# Patient Record
Sex: FEMALE | Race: WHITE | Employment: FULL TIME | URBAN - METROPOLITAN AREA
[De-identification: names, ages, dates, MRNs, and addresses within clinical notes are randomized per-mention and may not be internally consistent; named-entity substitution may affect disease eponyms.]

---

## 2017-06-06 ENCOUNTER — TELEPHONE (OUTPATIENT)
Dept: FAMILY MEDICINE CLINIC | Facility: CLINIC | Age: 27
End: 2017-06-06

## 2017-06-06 NOTE — TELEPHONE ENCOUNTER
Actions Requested: sent to ED for immediate eval  Situation/Background   Problem: \"abdominal pain from UTI that is moving up my side to my back and wraps around the kidney\"   Onset: > 3days   Associated Symptoms: afebrile,+ dysuria + urinary frequency, d

## 2017-06-06 NOTE — TELEPHONE ENCOUNTER
Patient states she has a severe UTI and back pain since Friday of last week. Patient requesting an appt. with Dr. Jovan Guerrero.   Nothing available for this week other than same day sick

## 2017-06-07 NOTE — TELEPHONE ENCOUNTER
Left message for patient, agree with emergency room evaluation but if she has not yet gone okay to page me to discuss symptoms and possible empiric treatment

## 2017-06-08 NOTE — TELEPHONE ENCOUNTER
Dr Anahy Blackman,    Pt went to [facility],  with pylonephritis. She was given bactrim & morphine. Feels slightly better but discomfort persist. Pt has f/u appt with Dr Anahy Blackman 6/13/17 @ 2:15pm.  Sent to Dr BARRERA [facility] for update.

## 2017-06-08 NOTE — TELEPHONE ENCOUNTER
Please contact rash to obtain results of urine culture if available. Patient try ibuprofen  mg (3 200 mg tablets) every 6 hours as needed for pain. Let us know if this is not effective.

## 2017-06-08 NOTE — TELEPHONE ENCOUNTER
Patient stated that since yesterday symptoms have gotten worse. Having mid-low back pain. Pain level currently is 6/10. Patient started Bactrim yesterday morning and was prescribed for 2 weeks.  Patient was given morphine in the hospital but was not given a

## 2017-06-08 NOTE — TELEPHONE ENCOUNTER
Reviewed doctor's recommendations with pt, pt agreed with plan of care and had no further questions at this time. Urine test results will be faxed to [facility] today.

## 2017-06-13 ENCOUNTER — OFFICE VISIT (OUTPATIENT)
Dept: FAMILY MEDICINE CLINIC | Facility: CLINIC | Age: 27
End: 2017-06-13

## 2017-06-13 VITALS
TEMPERATURE: 98 F | BODY MASS INDEX: 24.58 KG/M2 | RESPIRATION RATE: 18 BRPM | SYSTOLIC BLOOD PRESSURE: 110 MMHG | HEART RATE: 86 BPM | WEIGHT: 137 LBS | HEIGHT: 62.72 IN | DIASTOLIC BLOOD PRESSURE: 77 MMHG

## 2017-06-13 DIAGNOSIS — F90.0 ATTENTION DEFICIT HYPERACTIVITY DISORDER (ADHD), PREDOMINANTLY INATTENTIVE TYPE: ICD-10-CM

## 2017-06-13 DIAGNOSIS — N12 PYELONEPHRITIS: Primary | ICD-10-CM

## 2017-06-13 DIAGNOSIS — Z72.0 TOBACCO ABUSE: ICD-10-CM

## 2017-06-13 DIAGNOSIS — Q62.8: Chronic | ICD-10-CM

## 2017-06-13 DIAGNOSIS — R93.429 ABNORMAL FINDING ON DIAGNOSTIC IMAGING OF KIDNEY: ICD-10-CM

## 2017-06-13 PROCEDURE — 99213 OFFICE O/P EST LOW 20 MIN: CPT | Performed by: FAMILY MEDICINE

## 2017-06-13 PROCEDURE — 99214 OFFICE O/P EST MOD 30 MIN: CPT | Performed by: FAMILY MEDICINE

## 2017-06-13 RX ORDER — DEXTROAMPHETAMINE SACCHARATE, AMPHETAMINE ASPARTATE, DEXTROAMPHETAMINE SULFATE AND AMPHETAMINE SULFATE 2.5; 2.5; 2.5; 2.5 MG/1; MG/1; MG/1; MG/1
TABLET ORAL
Qty: 120 TABLET | Refills: 0 | Status: SHIPPED | OUTPATIENT
Start: 2017-06-13 | End: 2017-07-06

## 2017-06-13 RX ORDER — SULFAMETHOXAZOLE AND TRIMETHOPRIM 800; 160 MG/1; MG/1
1 TABLET ORAL
COMMUNITY
Start: 2017-06-06 | End: 2017-06-20

## 2017-06-13 RX ORDER — LEVONORGESTREL AND ETHINYL ESTRADIOL 0.15-0.03
1 KIT ORAL
Qty: 91 TABLET | Refills: 2 | Status: SHIPPED | OUTPATIENT
Start: 2017-06-13 | End: 2017-10-09

## 2017-06-13 RX ORDER — DULOXETIN HYDROCHLORIDE 30 MG/1
CAPSULE, DELAYED RELEASE ORAL
Refills: 0 | COMMUNITY
Start: 2017-05-04 | End: 2017-06-13 | Stop reason: ALTCHOICE

## 2017-06-13 RX ORDER — DEXTROAMPHETAMINE SACCHARATE, AMPHETAMINE ASPARTATE, DEXTROAMPHETAMINE SULFATE AND AMPHETAMINE SULFATE 2.5; 2.5; 2.5; 2.5 MG/1; MG/1; MG/1; MG/1
TABLET ORAL
Qty: 120 TABLET | Refills: 0 | Status: SHIPPED | OUTPATIENT
Start: 2017-06-13 | End: 2017-09-19

## 2017-06-13 RX ORDER — PROPRANOLOL HYDROCHLORIDE 80 MG/1
CAPSULE, EXTENDED RELEASE ORAL
Refills: 9 | COMMUNITY
Start: 2017-06-06 | End: 2017-06-13 | Stop reason: ALTCHOICE

## 2017-06-13 RX ORDER — DEXTROAMPHETAMINE SACCHARATE, AMPHETAMINE ASPARTATE, DEXTROAMPHETAMINE SULFATE AND AMPHETAMINE SULFATE 2.5; 2.5; 2.5; 2.5 MG/1; MG/1; MG/1; MG/1
TABLET ORAL
Qty: 120 TABLET | Refills: 0 | Status: SHIPPED | OUTPATIENT
Start: 2017-06-13 | End: 2017-06-16

## 2017-06-13 RX ORDER — LISDEXAMFETAMINE DIMESYLATE 50 MG
CAPSULE ORAL
Refills: 0 | COMMUNITY
Start: 2017-05-04 | End: 2017-06-13 | Stop reason: ALTCHOICE

## 2017-06-13 NOTE — PATIENT INSTRUCTIONS
How to Quit Smoking  Smoking is one of the hardest habits to break. About half of all people who have ever smoked have been able to quit. Most people who still smoke want to quit. Here are some of the best ways to stop smoking.     Keep trying  It takes m After reviewing your smoking patterns and prior attempts to quit, your doctor may offer a prescription medicine such as bupropion, varenicline, a nicotine inhaler, or nasal spray. Each has advantages and side effects. Your doctor can review these with you.

## 2017-06-14 DIAGNOSIS — N10 ACUTE PYELONEPHRITIS: Primary | ICD-10-CM

## 2017-06-16 RX ORDER — DEXTROAMPHETAMINE SACCHARATE, AMPHETAMINE ASPARTATE, DEXTROAMPHETAMINE SULFATE AND AMPHETAMINE SULFATE 2.5; 2.5; 2.5; 2.5 MG/1; MG/1; MG/1; MG/1
TABLET ORAL
Qty: 120 TABLET | Refills: 0 | Status: SHIPPED | OUTPATIENT
Start: 2017-06-16 | End: 2017-07-06

## 2017-06-23 ENCOUNTER — TELEPHONE (OUTPATIENT)
Dept: FAMILY MEDICINE CLINIC | Facility: CLINIC | Age: 27
End: 2017-06-23

## 2017-07-03 ENCOUNTER — HOSPITAL ENCOUNTER (OUTPATIENT)
Dept: ULTRASOUND IMAGING | Age: 27
Discharge: HOME | End: 2017-07-03
Attending: FAMILY MEDICINE
Payer: COMMERCIAL

## 2017-07-03 DIAGNOSIS — N12 PYELONEPHRITIS: ICD-10-CM

## 2017-07-03 DIAGNOSIS — Q62.8: Chronic | ICD-10-CM

## 2017-07-03 DIAGNOSIS — R93.429 ABNORMAL FINDING ON DIAGNOSTIC IMAGING OF KIDNEY: ICD-10-CM

## 2017-07-03 PROCEDURE — 76770 US EXAM ABDO BACK WALL COMP: CPT | Performed by: FAMILY MEDICINE

## 2017-07-06 ENCOUNTER — APPOINTMENT (OUTPATIENT)
Dept: LAB | Facility: HOSPITAL | Age: 27
End: 2017-07-06
Attending: FAMILY MEDICINE
Payer: COMMERCIAL

## 2017-07-06 ENCOUNTER — OFFICE VISIT (OUTPATIENT)
Dept: NEPHROLOGY | Facility: CLINIC | Age: 27
End: 2017-07-06

## 2017-07-06 VITALS
BODY MASS INDEX: 25.36 KG/M2 | TEMPERATURE: 99 F | WEIGHT: 137.81 LBS | DIASTOLIC BLOOD PRESSURE: 84 MMHG | HEART RATE: 79 BPM | HEIGHT: 62 IN | SYSTOLIC BLOOD PRESSURE: 118 MMHG

## 2017-07-06 DIAGNOSIS — N10 ACUTE PYELONEPHRITIS: ICD-10-CM

## 2017-07-06 DIAGNOSIS — N12 PYELONEPHRITIS: Primary | ICD-10-CM

## 2017-07-06 LAB
BILIRUB UR QL: NEGATIVE
COLOR UR: YELLOW
GLUCOSE UR-MCNC: NEGATIVE MG/DL
KETONES UR-MCNC: NEGATIVE MG/DL
NITRITE UR QL STRIP.AUTO: NEGATIVE
PH UR: 6 [PH] (ref 5–8)
PROT UR-MCNC: NEGATIVE MG/DL
RBC #/AREA URNS AUTO: 1 /HPF
SP GR UR STRIP: 1.01 (ref 1–1.03)
UROBILINOGEN UR STRIP-ACNC: <2
VIT C UR-MCNC: NEGATIVE MG/DL
WBC #/AREA URNS AUTO: 2 /HPF

## 2017-07-06 PROCEDURE — 99244 OFF/OP CNSLTJ NEW/EST MOD 40: CPT | Performed by: INTERNAL MEDICINE

## 2017-07-06 PROCEDURE — 99212 OFFICE O/P EST SF 10 MIN: CPT | Performed by: INTERNAL MEDICINE

## 2017-07-06 PROCEDURE — 87086 URINE CULTURE/COLONY COUNT: CPT

## 2017-07-06 PROCEDURE — 81001 URINALYSIS AUTO W/SCOPE: CPT

## 2017-07-06 RX ORDER — PROPRANOLOL HYDROCHLORIDE 80 MG/1
80 CAPSULE, EXTENDED RELEASE ORAL DAILY
Refills: 9 | COMMUNITY
Start: 2017-06-30 | End: 2017-10-09 | Stop reason: ALTCHOICE

## 2017-07-06 NOTE — PROGRESS NOTES
Consult Requested By: Dr. Oumou Maradiaga     Reason for Consult:  Follow up on pyelonephritis     HPI:     Cele Moyer is a 23 yrs old female with pmh of ADHD, congenital anomaly of ureter s/p surgery, recent pyelonephritis who presented today for follow drainage  Eyes:  Negative for eye discharge and vision loss  Cardiovascular:  Negative for chest pain, sob, irregular heartbeat/palpitations  Respiratory:  Negative for cough, dyspnea and wheezing  Gastrointestinal:  Negative for abdominal pain, constipati 7/2016     Follow up as needed        Orders This Visit:  No orders of the defined types were placed in this encounter.       Meds This Visit:    No prescriptions requested or ordered in this encounter       Imaging & Referrals:  None     7/6/2017  Georges Robles

## 2017-09-18 RX ORDER — DEXTROAMPHETAMINE SACCHARATE, AMPHETAMINE ASPARTATE, DEXTROAMPHETAMINE SULFATE AND AMPHETAMINE SULFATE 2.5; 2.5; 2.5; 2.5 MG/1; MG/1; MG/1; MG/1
TABLET ORAL
Qty: 120 TABLET | Refills: 0 | Status: CANCELLED | OUTPATIENT
Start: 2017-09-18

## 2017-09-18 RX ORDER — DEXTROAMPHETAMINE SACCHARATE, AMPHETAMINE ASPARTATE, DEXTROAMPHETAMINE SULFATE AND AMPHETAMINE SULFATE 2.5; 2.5; 2.5; 2.5 MG/1; MG/1; MG/1; MG/1
10 TABLET ORAL 3 TIMES DAILY
Qty: 90 TABLET | Refills: 0 | Status: SHIPPED | OUTPATIENT
Start: 2017-10-18 | End: 2017-11-17

## 2017-09-18 RX ORDER — DEXTROAMPHETAMINE SACCHARATE, AMPHETAMINE ASPARTATE, DEXTROAMPHETAMINE SULFATE AND AMPHETAMINE SULFATE 2.5; 2.5; 2.5; 2.5 MG/1; MG/1; MG/1; MG/1
10 TABLET ORAL 3 TIMES DAILY
Qty: 90 TABLET | Refills: 0 | Status: SHIPPED | OUTPATIENT
Start: 2017-09-18 | End: 2017-10-18

## 2017-09-18 NOTE — TELEPHONE ENCOUNTER
Pt calling regarding refill for amphetamine-dextroamphetamine (ADDERALL) 10 MG Oral Tab      Current Outpatient Prescriptions:  amphetamine-dextroamphetamine (ADDERALL) 10 MG Oral Tab 2 tablets by mouth at 8 AM, 1 tablet at 12 PM and 1 tablet at 4 PM Disp:

## 2017-09-18 NOTE — TELEPHONE ENCOUNTER
Requesting Adderall refill    Last filled 6/13/17  Last OV 6/13/17    Med pended for review, please advise

## 2017-09-19 RX ORDER — DEXTROAMPHETAMINE SACCHARATE, AMPHETAMINE ASPARTATE, DEXTROAMPHETAMINE SULFATE AND AMPHETAMINE SULFATE 2.5; 2.5; 2.5; 2.5 MG/1; MG/1; MG/1; MG/1
TABLET ORAL
Qty: 120 TABLET | Refills: 0 | Status: SHIPPED | OUTPATIENT
Start: 2017-11-18 | End: 2018-07-26

## 2017-09-21 NOTE — TELEPHONE ENCOUNTER
PA approved for up to 4 tabs per day effective 6/23/2017-6/23/2018. Patient notified via [address]
PA for Amphetamine Dextroamphetamine 10 mg tab completed with [employer] via CMM response time 3-5 business days KEY DANIEL.
Pt called in requesting to speak to an RN to get updates on this PA process, please.
Pt called to follow up.
Pt states Ad advised ins requesting override from Dr Luanne Harris for [facility] pharmacy was to have contacted Dr Luanne Harris 5 days ago- nothing in Epic  Pt thought issue maybe with dose/frequency the ins will cover  May need prior authorization    Isaias Carter
Patient/Caregiver provided printed discharge information.

## 2017-10-09 ENCOUNTER — OFFICE VISIT (OUTPATIENT)
Dept: FAMILY MEDICINE CLINIC | Facility: CLINIC | Age: 27
End: 2017-10-09

## 2017-10-09 VITALS
DIASTOLIC BLOOD PRESSURE: 78 MMHG | SYSTOLIC BLOOD PRESSURE: 122 MMHG | WEIGHT: 141.63 LBS | HEART RATE: 77 BPM | HEIGHT: 62.6 IN | RESPIRATION RATE: 17 BRPM | TEMPERATURE: 98 F | BODY MASS INDEX: 25.41 KG/M2

## 2017-10-09 DIAGNOSIS — Z72.0 TOBACCO ABUSE: ICD-10-CM

## 2017-10-09 DIAGNOSIS — Z01.419 ENCOUNTER FOR GYNECOLOGICAL EXAMINATION WITHOUT ABNORMAL FINDING: Primary | ICD-10-CM

## 2017-10-09 DIAGNOSIS — F90.0 ATTENTION DEFICIT HYPERACTIVITY DISORDER (ADHD), PREDOMINANTLY INATTENTIVE TYPE: ICD-10-CM

## 2017-10-09 PROCEDURE — 90686 IIV4 VACC NO PRSV 0.5 ML IM: CPT | Performed by: FAMILY MEDICINE

## 2017-10-09 PROCEDURE — 99395 PREV VISIT EST AGE 18-39: CPT | Performed by: FAMILY MEDICINE

## 2017-10-09 PROCEDURE — 90471 IMMUNIZATION ADMIN: CPT | Performed by: FAMILY MEDICINE

## 2017-10-09 RX ORDER — LEVONORGESTREL AND ETHINYL ESTRADIOL 0.15-0.03
1 KIT ORAL
Qty: 91 TABLET | Refills: 3 | Status: SHIPPED | OUTPATIENT
Start: 2017-10-09 | End: 2017-11-07

## 2017-10-09 NOTE — PROGRESS NOTES
HPI:    Patient ID: Rick Bahena is a 33year old female. HPI    Review of Systems   Constitutional: Negative. Respiratory: Negative. Cardiovascular: Negative. Gastrointestinal: Negative. Skin: Negative. Neurological: Negative. Skin is warm and dry. Psychiatric: She has a normal mood and affect.  Her behavior is normal. Judgment and thought content normal.              ASSESSMENT/PLAN:   Encounter for gynecological examination without abnormal finding  (primary encounter diagnos

## 2017-11-07 RX ORDER — LEVONORGESTREL AND ETHINYL ESTRADIOL 0.15-0.03
KIT ORAL
Qty: 91 TABLET | Refills: 0 | Status: SHIPPED | OUTPATIENT
Start: 2017-11-07 | End: 2018-04-27

## 2017-12-19 RX ORDER — DEXTROAMPHETAMINE SACCHARATE, AMPHETAMINE ASPARTATE, DEXTROAMPHETAMINE SULFATE AND AMPHETAMINE SULFATE 2.5; 2.5; 2.5; 2.5 MG/1; MG/1; MG/1; MG/1
TABLET ORAL
Qty: 120 TABLET | Refills: 0 | Status: SHIPPED | OUTPATIENT
Start: 2017-12-19 | End: 2018-07-26

## 2017-12-19 RX ORDER — DEXTROAMPHETAMINE SACCHARATE, AMPHETAMINE ASPARTATE, DEXTROAMPHETAMINE SULFATE AND AMPHETAMINE SULFATE 2.5; 2.5; 2.5; 2.5 MG/1; MG/1; MG/1; MG/1
TABLET ORAL
Qty: 120 TABLET | Refills: 0 | Status: CANCELLED | OUTPATIENT
Start: 2017-12-19

## 2017-12-19 RX ORDER — DEXTROAMPHETAMINE SACCHARATE, AMPHETAMINE ASPARTATE, DEXTROAMPHETAMINE SULFATE AND AMPHETAMINE SULFATE 2.5; 2.5; 2.5; 2.5 MG/1; MG/1; MG/1; MG/1
TABLET ORAL
Qty: 120 TABLET | Refills: 0 | Status: SHIPPED | OUTPATIENT
Start: 2018-02-19 | End: 2018-07-26

## 2017-12-19 RX ORDER — DEXTROAMPHETAMINE SACCHARATE, AMPHETAMINE ASPARTATE, DEXTROAMPHETAMINE SULFATE AND AMPHETAMINE SULFATE 2.5; 2.5; 2.5; 2.5 MG/1; MG/1; MG/1; MG/1
TABLET ORAL
Qty: 120 TABLET | Refills: 0 | Status: SHIPPED | OUTPATIENT
Start: 2018-01-19 | End: 2018-05-29

## 2017-12-19 NOTE — TELEPHONE ENCOUNTER
Patient is calling to request a refill for the medication listed below. Please advise. Patient is completely out.      amphetamine-dextroamphetamine (ADDERALL) 10 MG Oral Tab 2 tablets by mouth at 8 AM, 1 tablet at 12 PM and 1 tablet at 4 PM Disp: 120 table

## 2018-01-05 ENCOUNTER — OFFICE VISIT (OUTPATIENT)
Dept: FAMILY MEDICINE CLINIC | Facility: CLINIC | Age: 28
End: 2018-01-05

## 2018-01-05 VITALS
HEIGHT: 62.6 IN | HEART RATE: 101 BPM | SYSTOLIC BLOOD PRESSURE: 127 MMHG | TEMPERATURE: 98 F | BODY MASS INDEX: 25.41 KG/M2 | WEIGHT: 141.63 LBS | RESPIRATION RATE: 18 BRPM | DIASTOLIC BLOOD PRESSURE: 85 MMHG

## 2018-01-05 DIAGNOSIS — R39.9 URINARY TRACT INFECTION SYMPTOMS: Primary | ICD-10-CM

## 2018-01-05 PROCEDURE — 99213 OFFICE O/P EST LOW 20 MIN: CPT | Performed by: FAMILY MEDICINE

## 2018-01-05 PROCEDURE — 99212 OFFICE O/P EST SF 10 MIN: CPT | Performed by: FAMILY MEDICINE

## 2018-01-05 RX ORDER — SULFAMETHOXAZOLE AND TRIMETHOPRIM 800; 160 MG/1; MG/1
1 TABLET ORAL 2 TIMES DAILY
Qty: 20 TABLET | Refills: 0 | Status: SHIPPED | OUTPATIENT
Start: 2018-01-05 | End: 2018-01-15

## 2018-01-05 NOTE — PROGRESS NOTES
HPI:    Patient ID: Kaya Flores is a 33year old female. UTI   This is a recurrent problem. The current episode started in the past 7 days. The problem occurs intermittently. The problem has been gradually worsening.  Pertinent negatives include no abdo push fluids, Bactrim as directed pending culture results. Patient may not be able to access my chart. Call if not improved in 3 days.       Orders Placed This Encounter      Urine Culture, Routine [E]    Meds This Visit:  Signed Prescriptions Disp Refills

## 2018-04-30 ENCOUNTER — TELEPHONE (OUTPATIENT)
Dept: OTHER | Age: 28
End: 2018-04-30

## 2018-04-30 RX ORDER — LEVONORGESTREL AND ETHINYL ESTRADIOL 0.15-0.03
1 KIT ORAL
Qty: 91 TABLET | Refills: 2 | Status: SHIPPED | OUTPATIENT
Start: 2018-04-30 | End: 2019-03-18

## 2018-04-30 NOTE — TELEPHONE ENCOUNTER
[facility] called to ask for [facility] prescription for mail order pharmacy. Noted prescription was sent to [place] yesterday.  Pt was on the phone as well, states she picked up one 90-day prescription of med at [place] yest

## 2018-05-29 RX ORDER — DEXTROAMPHETAMINE SACCHARATE, AMPHETAMINE ASPARTATE, DEXTROAMPHETAMINE SULFATE AND AMPHETAMINE SULFATE 2.5; 2.5; 2.5; 2.5 MG/1; MG/1; MG/1; MG/1
TABLET ORAL
Qty: 120 TABLET | Refills: 0 | Status: SHIPPED | OUTPATIENT
Start: 2018-05-29 | End: 2018-07-26

## 2018-05-29 NOTE — TELEPHONE ENCOUNTER
Please advise on refill request.     Refill Protocol Appointment Criteria  · Appointment scheduled in the past 6 months or in the next 3 months  Recent Outpatient Visits            4 months ago Urinary tract infection symptoms    [address] [place]

## 2018-07-24 ENCOUNTER — NURSE TRIAGE (OUTPATIENT)
Dept: FAMILY MEDICINE CLINIC | Facility: CLINIC | Age: 28
End: 2018-07-24

## 2018-07-24 RX ORDER — DEXTROAMPHETAMINE SACCHARATE, AMPHETAMINE ASPARTATE, DEXTROAMPHETAMINE SULFATE AND AMPHETAMINE SULFATE 2.5; 2.5; 2.5; 2.5 MG/1; MG/1; MG/1; MG/1
TABLET ORAL
Qty: 120 TABLET | Refills: 0 | OUTPATIENT
Start: 2018-07-24

## 2018-07-24 RX ORDER — DEXTROAMPHETAMINE SACCHARATE, AMPHETAMINE ASPARTATE, DEXTROAMPHETAMINE SULFATE AND AMPHETAMINE SULFATE 2.5; 2.5; 2.5; 2.5 MG/1; MG/1; MG/1; MG/1
TABLET ORAL
Qty: 120 TABLET | Refills: 0 | OUTPATIENT
Start: 2018-09-22

## 2018-07-24 RX ORDER — DEXTROAMPHETAMINE SACCHARATE, AMPHETAMINE ASPARTATE, DEXTROAMPHETAMINE SULFATE AND AMPHETAMINE SULFATE 2.5; 2.5; 2.5; 2.5 MG/1; MG/1; MG/1; MG/1
TABLET ORAL
Qty: 120 TABLET | Refills: 0 | OUTPATIENT
Start: 2018-08-23

## 2018-07-24 RX ORDER — SULFAMETHOXAZOLE AND TRIMETHOPRIM 800; 160 MG/1; MG/1
1 TABLET ORAL 2 TIMES DAILY
Qty: 14 TABLET | Refills: 0 | Status: SHIPPED | OUTPATIENT
Start: 2018-07-24 | End: 2018-07-31

## 2018-07-24 NOTE — TELEPHONE ENCOUNTER
Dr SINGLETON=please see message below, Juan Carlos Rodriguez!!!    Spoke with patient (identified name and ) ,advised Dr Jose F Padron note  and verbalized understanding.  States that she will complete the culture on Thursday during OV, will not start the abx until the culture is don

## 2018-07-24 NOTE — TELEPHONE ENCOUNTER
Pending Prescriptions Disp Refills    amphetamine-dextroamphetamine (ADDERALL) 10 MG Oral Tab 120 tablet 0     Si tablets by mouth at 8 AM, 1 tablet at 12 PM and 1 tablet at 4 PM      amphetamine-dextroamphetamine (ADDERALL) 10 MG Oral Tab 120 tabl

## 2018-07-24 NOTE — TELEPHONE ENCOUNTER
Action Requested: Summary for Provider     []  Critical Lab, Recommendations Needed  [] Need Additional Advice  []   FYI    []   Need Orders  [x] Need Medications Sent to Pharmacy  []  Other     SUMMARY: Patient requesting antibiotic sent to her pharmacy f

## 2018-07-24 NOTE — TELEPHONE ENCOUNTER
Please let pt know that I strongly recommend come in for NV to provide urine culture befor starting Bactrim that I sent to pharmacy.  This way we can check for antibiotic sensitivities which is important for her

## 2018-07-24 NOTE — TELEPHONE ENCOUNTER
I do not understand this message. If she clear that I recommend starting antibiotic today, and she is refusing?

## 2018-07-24 NOTE — TELEPHONE ENCOUNTER
Advised patient of Dr. Evgeny Vences note. Patient verbalized understanding. Patient states she cannot come into office for nurse visit for urine culture. Per patient, she lives three hours away and cannot drive out to [facility] to get urine culture done.

## 2018-07-24 NOTE — TELEPHONE ENCOUNTER
She should start antibiotic today, otherwise at risk for pyelonephritis which she has had before. Try to complete culture but if unable to, just start antibiotic.

## 2018-07-24 NOTE — TELEPHONE ENCOUNTER
Patient scheduled follow-up appointment for Adderall. States she will take Azo over the counter until Thursday. States she will complete urine culture on Thursday while here for follow-up appointment. Dr. Joseph Miller:   Please disregard message below.

## 2018-07-25 NOTE — TELEPHONE ENCOUNTER
Spoke to patient. To clarify, patient states she has been told in the past not to start abx until culture is done. I reiterated your orders, to start today due to risk of pyelonephritis. Even if she cant submit specimen today, still start abx.   She state

## 2018-07-26 ENCOUNTER — OFFICE VISIT (OUTPATIENT)
Dept: FAMILY MEDICINE CLINIC | Facility: CLINIC | Age: 28
End: 2018-07-26
Payer: COMMERCIAL

## 2018-07-26 VITALS
DIASTOLIC BLOOD PRESSURE: 88 MMHG | BODY MASS INDEX: 25 KG/M2 | SYSTOLIC BLOOD PRESSURE: 122 MMHG | WEIGHT: 139 LBS | TEMPERATURE: 98 F | RESPIRATION RATE: 16 BRPM | HEART RATE: 96 BPM

## 2018-07-26 DIAGNOSIS — F32.A MILD DEPRESSIVE DISORDER: ICD-10-CM

## 2018-07-26 DIAGNOSIS — R39.9 URINARY TRACT INFECTION SYMPTOMS: Primary | ICD-10-CM

## 2018-07-26 DIAGNOSIS — Z72.0 TOBACCO ABUSE: ICD-10-CM

## 2018-07-26 DIAGNOSIS — F90.0 ATTENTION DEFICIT HYPERACTIVITY DISORDER (ADHD), PREDOMINANTLY INATTENTIVE TYPE: ICD-10-CM

## 2018-07-26 PROCEDURE — 99212 OFFICE O/P EST SF 10 MIN: CPT | Performed by: FAMILY MEDICINE

## 2018-07-26 PROCEDURE — 99214 OFFICE O/P EST MOD 30 MIN: CPT | Performed by: FAMILY MEDICINE

## 2018-07-26 RX ORDER — DEXTROAMPHETAMINE SACCHARATE, AMPHETAMINE ASPARTATE, DEXTROAMPHETAMINE SULFATE AND AMPHETAMINE SULFATE 2.5; 2.5; 2.5; 2.5 MG/1; MG/1; MG/1; MG/1
TABLET ORAL
Qty: 90 TABLET | Refills: 0 | Status: SHIPPED | OUTPATIENT
Start: 2018-09-26 | End: 2019-05-16

## 2018-07-26 RX ORDER — DEXTROAMPHETAMINE SACCHARATE, AMPHETAMINE ASPARTATE, DEXTROAMPHETAMINE SULFATE AND AMPHETAMINE SULFATE 2.5; 2.5; 2.5; 2.5 MG/1; MG/1; MG/1; MG/1
TABLET ORAL
Qty: 90 TABLET | Refills: 0 | Status: SHIPPED | OUTPATIENT
Start: 2018-07-26 | End: 2019-06-06

## 2018-07-26 RX ORDER — DEXTROAMPHETAMINE SACCHARATE, AMPHETAMINE ASPARTATE, DEXTROAMPHETAMINE SULFATE AND AMPHETAMINE SULFATE 2.5; 2.5; 2.5; 2.5 MG/1; MG/1; MG/1; MG/1
TABLET ORAL
Qty: 90 TABLET | Refills: 0 | Status: SHIPPED | OUTPATIENT
Start: 2018-08-26 | End: 2019-06-06

## 2018-07-26 NOTE — PROGRESS NOTES
HPI:    Patient ID: Carolina Dickens is a 33year old female. Urinary   This is a recurrent problem. The current episode started in the past 7 days. The problem has been gradually worsening. Associated symptoms include fatigue and urinary symptoms.  Violet Pounds guarding. CVA nontender, suprapubic tenderness present   Musculoskeletal: She exhibits no edema. Lymphadenopathy:     She has no cervical adenopathy. Neurological: She is alert and oriented to person, place, and time. Skin: Skin is warm and dry. and 1 tablet at 4 PM           Imaging & Referrals:  None       #[id number]

## 2018-10-23 ENCOUNTER — OFFICE VISIT (OUTPATIENT)
Dept: FAMILY MEDICINE CLINIC | Facility: CLINIC | Age: 28
End: 2018-10-23
Payer: COMMERCIAL

## 2018-10-23 VITALS
HEART RATE: 86 BPM | TEMPERATURE: 98 F | RESPIRATION RATE: 16 BRPM | BODY MASS INDEX: 26 KG/M2 | DIASTOLIC BLOOD PRESSURE: 82 MMHG | WEIGHT: 144 LBS | SYSTOLIC BLOOD PRESSURE: 118 MMHG

## 2018-10-23 DIAGNOSIS — Z71.84 TRAVEL ADVICE ENCOUNTER: Primary | ICD-10-CM

## 2018-10-23 DIAGNOSIS — F90.0 ATTENTION DEFICIT HYPERACTIVITY DISORDER (ADHD), PREDOMINANTLY INATTENTIVE TYPE: ICD-10-CM

## 2018-10-23 DIAGNOSIS — Z72.0 TOBACCO ABUSE: ICD-10-CM

## 2018-10-23 DIAGNOSIS — F40.243 FLYING PHOBIA: ICD-10-CM

## 2018-10-23 PROCEDURE — 90632 HEPA VACCINE ADULT IM: CPT | Performed by: FAMILY MEDICINE

## 2018-10-23 PROCEDURE — 90472 IMMUNIZATION ADMIN EACH ADD: CPT | Performed by: FAMILY MEDICINE

## 2018-10-23 PROCEDURE — 99212 OFFICE O/P EST SF 10 MIN: CPT | Performed by: FAMILY MEDICINE

## 2018-10-23 PROCEDURE — 90686 IIV4 VACC NO PRSV 0.5 ML IM: CPT | Performed by: FAMILY MEDICINE

## 2018-10-23 PROCEDURE — 99214 OFFICE O/P EST MOD 30 MIN: CPT | Performed by: FAMILY MEDICINE

## 2018-10-23 PROCEDURE — 90471 IMMUNIZATION ADMIN: CPT | Performed by: FAMILY MEDICINE

## 2018-10-23 RX ORDER — CLONAZEPAM 0.5 MG/1
TABLET ORAL
Qty: 15 TABLET | Refills: 0 | Status: SHIPPED | OUTPATIENT
Start: 2018-10-23 | End: 2019-06-06 | Stop reason: ALTCHOICE

## 2018-10-23 RX ORDER — AZITHROMYCIN 250 MG/1
1000 TABLET, FILM COATED ORAL ONCE
Qty: 4 TABLET | Refills: 0 | Status: SHIPPED | OUTPATIENT
Start: 2018-10-23 | End: 2018-10-23

## 2018-10-23 RX ORDER — DEXTROAMPHETAMINE SACCHARATE, AMPHETAMINE ASPARTATE, DEXTROAMPHETAMINE SULFATE AND AMPHETAMINE SULFATE 2.5; 2.5; 2.5; 2.5 MG/1; MG/1; MG/1; MG/1
10 TABLET ORAL 3 TIMES DAILY
Qty: 90 TABLET | Refills: 0 | Status: SHIPPED | OUTPATIENT
Start: 2018-10-23 | End: 2018-11-22

## 2018-10-23 RX ORDER — CIPROFLOXACIN 250 MG/1
250 TABLET, FILM COATED ORAL 2 TIMES DAILY
Qty: 10 TABLET | Refills: 0 | Status: SHIPPED | OUTPATIENT
Start: 2018-10-23 | End: 2018-10-28

## 2018-10-23 RX ORDER — DEXTROAMPHETAMINE SACCHARATE, AMPHETAMINE ASPARTATE, DEXTROAMPHETAMINE SULFATE AND AMPHETAMINE SULFATE 2.5; 2.5; 2.5; 2.5 MG/1; MG/1; MG/1; MG/1
10 TABLET ORAL 3 TIMES DAILY
Qty: 90 TABLET | Refills: 0 | Status: SHIPPED | OUTPATIENT
Start: 2018-12-23 | End: 2019-01-22

## 2018-10-23 RX ORDER — ATOVAQUONE AND PROGUANIL HYDROCHLORIDE 250; 100 MG/1; MG/1
1 TABLET, FILM COATED ORAL DAILY
Qty: 25 TABLET | Refills: 0 | Status: SHIPPED | OUTPATIENT
Start: 2018-10-23 | End: 2018-11-22

## 2018-10-23 RX ORDER — DEXTROAMPHETAMINE SACCHARATE, AMPHETAMINE ASPARTATE, DEXTROAMPHETAMINE SULFATE AND AMPHETAMINE SULFATE 2.5; 2.5; 2.5; 2.5 MG/1; MG/1; MG/1; MG/1
10 TABLET ORAL 3 TIMES DAILY
Qty: 90 TABLET | Refills: 0 | Status: SHIPPED | OUTPATIENT
Start: 2018-11-23 | End: 2018-12-23

## 2018-10-23 NOTE — PROGRESS NOTES
HPI:    Patient ID: Kaya Flores is a 33year old female. See below      Consult         Review of Systems   Constitutional: Negative. Respiratory: Negative. Cardiovascular: Negative. Gastrointestinal: Negative. Skin: Negative.     Neurologic Disp: 90 tablet Rfl: 0   Levonorgest-Eth Estrad 91-Day (QUASENSE) 0.15-0.03 MG Oral Tab Take 1 tablet by mouth once daily.  Disp: 91 tablet Rfl: 2     Allergies:  Kiwi Extract            RASH   PHYSICAL EXAM:   Physical Exam   Constitutional: She is oriente mouth every other day. • Atovaquone-Proguanil HCl (MALARONE) 250-100 MG Oral Tab 25 tablet 0     Sig: Take 1 tablet by mouth daily. Start 1 day before travel and continue until 7 days after return.    • azithromycin 250 MG Oral Tab 4 tablet 0     Sig: Swetha Galarza

## 2019-01-23 ENCOUNTER — TELEPHONE (OUTPATIENT)
Dept: FAMILY MEDICINE CLINIC | Facility: CLINIC | Age: 29
End: 2019-01-23

## 2019-01-23 NOTE — TELEPHONE ENCOUNTER
Pt is calling because she had a script for medication that she took to have filled at her pharmacy and was told that it had . Pt stts that the date on the script .  Please advise      Current Outpatient Medications:              amphetamine-dext

## 2019-01-24 RX ORDER — DEXTROAMPHETAMINE SACCHARATE, AMPHETAMINE ASPARTATE, DEXTROAMPHETAMINE SULFATE AND AMPHETAMINE SULFATE 2.5; 2.5; 2.5; 2.5 MG/1; MG/1; MG/1; MG/1
10 TABLET ORAL 3 TIMES DAILY
Qty: 90 TABLET | Refills: 0 | Status: SHIPPED | OUTPATIENT
Start: 2019-02-24 | End: 2019-03-26

## 2019-01-24 RX ORDER — DEXTROAMPHETAMINE SACCHARATE, AMPHETAMINE ASPARTATE, DEXTROAMPHETAMINE SULFATE AND AMPHETAMINE SULFATE 2.5; 2.5; 2.5; 2.5 MG/1; MG/1; MG/1; MG/1
10 TABLET ORAL 3 TIMES DAILY
Qty: 90 TABLET | Refills: 0 | Status: SHIPPED | OUTPATIENT
Start: 2019-03-24 | End: 2019-04-23

## 2019-01-24 RX ORDER — DEXTROAMPHETAMINE SACCHARATE, AMPHETAMINE ASPARTATE, DEXTROAMPHETAMINE SULFATE AND AMPHETAMINE SULFATE 2.5; 2.5; 2.5; 2.5 MG/1; MG/1; MG/1; MG/1
10 TABLET ORAL 3 TIMES DAILY
Qty: 90 TABLET | Refills: 0 | Status: SHIPPED | OUTPATIENT
Start: 2019-01-24 | End: 2019-02-23

## 2019-01-24 NOTE — TELEPHONE ENCOUNTER
Controlled medication pending for review. If approved needs to be called in or faxed by on-site staff. Last Rx: 10/23/18  LOV: 10/23/18    Patient is out of medication;  Please contact when rx is ready.

## 2019-03-19 RX ORDER — LEVONORGESTREL AND ETHINYL ESTRADIOL 0.15-0.03
KIT ORAL
Qty: 91 TABLET | Refills: 1 | Status: SHIPPED | OUTPATIENT
Start: 2019-03-19 | End: 2019-09-16

## 2019-03-19 NOTE — TELEPHONE ENCOUNTER
Please call patient and schedule appt for routine annual physical, one refill sent but MUST make appt at this time

## 2019-03-19 NOTE — TELEPHONE ENCOUNTER
Gynecology Medications  Protocol Criteria:  · Appointment scheduled in the past 12 months or the next 3 months  · Pap smear in the past 12 months  · Pap smear WNL manually verified  Recent Outpatient Visits            4 months ago Travel advice encounter

## 2019-03-23 NOTE — TELEPHONE ENCOUNTER
Left detailed message on voice mail stating name to call back and schedule a routine physical appt with Dr. Ladarius Hanson

## 2019-05-16 DIAGNOSIS — M24.9 HYPERMOBILE JOINTS: Primary | ICD-10-CM

## 2019-05-16 DIAGNOSIS — M21.619 BUNION OF GREAT TOE: ICD-10-CM

## 2019-05-16 NOTE — TELEPHONE ENCOUNTER
Patient following up, has annual phys scheduled on 6/6/19 with Dr BARRERA [facility], reports has a hx of chronic foot pain due to bunions and a bone spur, recently started new job with a lot of standing and is having increased foot pain, would like a referral to follow

## 2019-05-17 ENCOUNTER — TELEPHONE (OUTPATIENT)
Dept: FAMILY MEDICINE CLINIC | Facility: CLINIC | Age: 29
End: 2019-05-17

## 2019-05-17 NOTE — TELEPHONE ENCOUNTER
Patients mom came to [place] [address] office to  prescription,the triage nurse put in message after Dr Gregoria Ellison left on 5-16-19,--she will wait till Dr Gregoria Ellison comes back to office on 5-21-19.

## 2019-05-18 RX ORDER — DEXTROAMPHETAMINE SACCHARATE, AMPHETAMINE ASPARTATE, DEXTROAMPHETAMINE SULFATE AND AMPHETAMINE SULFATE 2.5; 2.5; 2.5; 2.5 MG/1; MG/1; MG/1; MG/1
TABLET ORAL
Qty: 90 TABLET | Refills: 0 | Status: SHIPPED | OUTPATIENT
Start: 2019-05-18 | End: 2019-06-06

## 2019-06-06 ENCOUNTER — OFFICE VISIT (OUTPATIENT)
Dept: FAMILY MEDICINE CLINIC | Facility: CLINIC | Age: 29
End: 2019-06-06
Payer: COMMERCIAL

## 2019-06-06 VITALS
TEMPERATURE: 98 F | WEIGHT: 148.38 LBS | DIASTOLIC BLOOD PRESSURE: 80 MMHG | HEART RATE: 92 BPM | SYSTOLIC BLOOD PRESSURE: 130 MMHG | HEIGHT: 63.13 IN | RESPIRATION RATE: 18 BRPM | BODY MASS INDEX: 26.29 KG/M2

## 2019-06-06 DIAGNOSIS — M79.672 FOOT PAIN, BILATERAL: ICD-10-CM

## 2019-06-06 DIAGNOSIS — F17.200 TOBACCO USE DISORDER: ICD-10-CM

## 2019-06-06 DIAGNOSIS — M21.612 BUNION, LEFT: ICD-10-CM

## 2019-06-06 DIAGNOSIS — Z00.00 ROUTINE PHYSICAL EXAMINATION: Primary | ICD-10-CM

## 2019-06-06 DIAGNOSIS — F98.8 ATTENTION DEFICIT DISORDER (ADD) WITHOUT HYPERACTIVITY: Chronic | ICD-10-CM

## 2019-06-06 DIAGNOSIS — M79.671 FOOT PAIN, BILATERAL: ICD-10-CM

## 2019-06-06 PROCEDURE — 99395 PREV VISIT EST AGE 18-39: CPT | Performed by: FAMILY MEDICINE

## 2019-06-06 RX ORDER — DIAZEPAM 5 MG/1
TABLET ORAL
Qty: 6 TABLET | Refills: 0 | Status: SHIPPED | OUTPATIENT
Start: 2019-06-06 | End: 2020-01-30

## 2019-06-06 RX ORDER — DEXTROAMPHETAMINE SACCHARATE, AMPHETAMINE ASPARTATE, DEXTROAMPHETAMINE SULFATE AND AMPHETAMINE SULFATE 2.5; 2.5; 2.5; 2.5 MG/1; MG/1; MG/1; MG/1
10 TABLET ORAL 3 TIMES DAILY
Qty: 90 TABLET | Refills: 0 | Status: SHIPPED | OUTPATIENT
Start: 2019-08-20 | End: 2019-09-19

## 2019-06-06 RX ORDER — DEXTROAMPHETAMINE SACCHARATE, AMPHETAMINE ASPARTATE, DEXTROAMPHETAMINE SULFATE AND AMPHETAMINE SULFATE 2.5; 2.5; 2.5; 2.5 MG/1; MG/1; MG/1; MG/1
10 TABLET ORAL 3 TIMES DAILY
Qty: 90 TABLET | Refills: 0 | Status: SHIPPED | OUTPATIENT
Start: 2019-07-20 | End: 2019-08-19

## 2019-06-06 RX ORDER — DEXTROAMPHETAMINE SACCHARATE, AMPHETAMINE ASPARTATE, DEXTROAMPHETAMINE SULFATE AND AMPHETAMINE SULFATE 2.5; 2.5; 2.5; 2.5 MG/1; MG/1; MG/1; MG/1
10 TABLET ORAL 3 TIMES DAILY
Qty: 90 TABLET | Refills: 0 | Status: SHIPPED | OUTPATIENT
Start: 2019-06-20 | End: 2019-07-20

## 2019-06-06 NOTE — PROGRESS NOTES
HPI:    Patient ID: Chano Oates is a 33year old female. HPI    Review of Systems   Constitutional: Negative. Respiratory: Negative. Cardiovascular: Negative. Gastrointestinal: Negative. Skin: Negative. Neurological: Negative. finishing her masters for . Encourage regular exercise. Immunizations up-to-date. Return for fasting labs. 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   Will try Valium as directed reviewed instructions and side effects of medica

## 2019-09-18 RX ORDER — LEVONORGESTREL AND ETHINYL ESTRADIOL 0.15-0.03
KIT ORAL
Qty: 91 TABLET | Refills: 1 | Status: SHIPPED | OUTPATIENT
Start: 2019-09-18 | End: 2020-03-16

## 2019-09-18 NOTE — TELEPHONE ENCOUNTER
Gynecology Medications  Protocol Criteria:  · Appointment scheduled in the past 12 months or the next 3 months  · Pap smear in the past 12 months  · Pap smear WNL manually verified  Recent Outpatient Visits            3 months ago Routine physical examinat

## 2019-09-20 RX ORDER — DEXTROAMPHETAMINE SACCHARATE, AMPHETAMINE ASPARTATE, DEXTROAMPHETAMINE SULFATE AND AMPHETAMINE SULFATE 2.5; 2.5; 2.5; 2.5 MG/1; MG/1; MG/1; MG/1
10 TABLET ORAL 3 TIMES DAILY
Qty: 90 TABLET | Refills: 0 | Status: SHIPPED | OUTPATIENT
Start: 2019-09-20 | End: 2020-02-25

## 2019-09-20 RX ORDER — DEXTROAMPHETAMINE SACCHARATE, AMPHETAMINE ASPARTATE, DEXTROAMPHETAMINE SULFATE AND AMPHETAMINE SULFATE 2.5; 2.5; 2.5; 2.5 MG/1; MG/1; MG/1; MG/1
10 TABLET ORAL 3 TIMES DAILY
Qty: 90 TABLET | Refills: 0 | Status: SHIPPED | OUTPATIENT
Start: 2019-10-20 | End: 2019-11-19

## 2019-09-20 RX ORDER — DEXTROAMPHETAMINE SACCHARATE, AMPHETAMINE ASPARTATE, DEXTROAMPHETAMINE SULFATE AND AMPHETAMINE SULFATE 2.5; 2.5; 2.5; 2.5 MG/1; MG/1; MG/1; MG/1
10 TABLET ORAL 3 TIMES DAILY
Qty: 90 TABLET | Refills: 0 | Status: SHIPPED | OUTPATIENT
Start: 2019-11-19 | End: 2019-12-19

## 2019-09-20 NOTE — TELEPHONE ENCOUNTER
Patient requesting refill for amphetamine-dextroamphetamine (ADDERALL) 10 MG Oral Tab    Patient will be out in 2 days so requesting to  on Monday 09/23/2019.

## 2019-09-20 NOTE — TELEPHONE ENCOUNTER
Please contact patient and let her know prescriptions sent straight to pharmacy. If no appointment within the past 6 months must schedule at this time.

## 2019-12-12 ENCOUNTER — TELEPHONE (OUTPATIENT)
Dept: FAMILY MEDICINE CLINIC | Facility: CLINIC | Age: 29
End: 2019-12-12

## 2019-12-12 RX ORDER — DEXTROAMPHETAMINE SACCHARATE, AMPHETAMINE ASPARTATE, DEXTROAMPHETAMINE SULFATE AND AMPHETAMINE SULFATE 2.5; 2.5; 2.5; 2.5 MG/1; MG/1; MG/1; MG/1
10 TABLET ORAL 3 TIMES DAILY
Qty: 90 TABLET | Refills: 0 | Status: SHIPPED | OUTPATIENT
Start: 2019-12-12 | End: 2020-01-28

## 2019-12-12 NOTE — TELEPHONE ENCOUNTER
Please inform patient I sent Rx to pharmacy. She is due for 6-month follow-up appointment, schedule at this time.

## 2019-12-12 NOTE — TELEPHONE ENCOUNTER
Patient states she's going out of town for three weeks and leaving this Saturday 12/14 at 4 am.    Patient requesting an early refill for her Adderall as she will be out of it while on vacation. Please advise.

## 2020-01-25 NOTE — TELEPHONE ENCOUNTER
Received a call from the patient requesting a refill on her Adderall. Order pended and routed to provider for review. Last office visit: 6/6/19. Last refill: 12/12/19.

## 2020-01-26 RX ORDER — DEXTROAMPHETAMINE SACCHARATE, AMPHETAMINE ASPARTATE, DEXTROAMPHETAMINE SULFATE AND AMPHETAMINE SULFATE 2.5; 2.5; 2.5; 2.5 MG/1; MG/1; MG/1; MG/1
10 TABLET ORAL 3 TIMES DAILY
Qty: 90 TABLET | Refills: 0 | OUTPATIENT
Start: 2020-01-26

## 2020-01-28 RX ORDER — DEXTROAMPHETAMINE SACCHARATE, AMPHETAMINE ASPARTATE, DEXTROAMPHETAMINE SULFATE AND AMPHETAMINE SULFATE 2.5; 2.5; 2.5; 2.5 MG/1; MG/1; MG/1; MG/1
10 TABLET ORAL 3 TIMES DAILY
Qty: 90 TABLET | Refills: 0 | Status: SHIPPED | OUTPATIENT
Start: 2020-01-28 | End: 2020-02-25

## 2020-01-28 NOTE — TELEPHONE ENCOUNTER
Patient called to follow-up on refill. Advised patient appointment is needed. Patient scheduled to see Dr. Rosa M Duffy on 1/30/20 at 11:45 a.m. Patient is requesting that refill be sent to pharmacy before Thursday because she is completely out.    Jud

## 2020-01-30 NOTE — PROGRESS NOTES
HPI:    Patient ID: Shaye Petersen is a 30year old female. ADD   This is a chronic problem. The current episode started more than 1 year ago. The problem occurs daily. The problem has been unchanged.  Pertinent negatives include no fatigue, headaches or n phobia– diazepam was not effective. Recently used her father's alprazolam which was helpful. Rx for alprazolam given. Discussed instructions and side effects.     Orders Placed This Encounter      Flulaval 0.5 ml 6 mon and older Quad single dose PF ([id number]

## 2020-02-25 RX ORDER — DEXTROAMPHETAMINE SACCHARATE, AMPHETAMINE ASPARTATE, DEXTROAMPHETAMINE SULFATE AND AMPHETAMINE SULFATE 2.5; 2.5; 2.5; 2.5 MG/1; MG/1; MG/1; MG/1
10 TABLET ORAL 3 TIMES DAILY
Qty: 90 TABLET | Refills: 0 | Status: SHIPPED | OUTPATIENT
Start: 2020-02-25 | End: 2020-06-08

## 2020-02-25 RX ORDER — DEXTROAMPHETAMINE SACCHARATE, AMPHETAMINE ASPARTATE, DEXTROAMPHETAMINE SULFATE AND AMPHETAMINE SULFATE 2.5; 2.5; 2.5; 2.5 MG/1; MG/1; MG/1; MG/1
10 TABLET ORAL 3 TIMES DAILY
Qty: 90 TABLET | Refills: 0 | Status: SHIPPED | OUTPATIENT
Start: 2020-03-25 | End: 2020-03-10

## 2020-02-25 RX ORDER — DEXTROAMPHETAMINE SACCHARATE, AMPHETAMINE ASPARTATE, DEXTROAMPHETAMINE SULFATE AND AMPHETAMINE SULFATE 2.5; 2.5; 2.5; 2.5 MG/1; MG/1; MG/1; MG/1
10 TABLET ORAL 3 TIMES DAILY
Qty: 90 TABLET | Refills: 0 | Status: SHIPPED | OUTPATIENT
Start: 2020-04-25 | End: 2020-03-10

## 2020-02-25 NOTE — TELEPHONE ENCOUNTER
Patient is requesting 3 scripts be sent to her pharmacy for the refill. Controlled medication pending for review. Please change to phone in, fax, or print script if not being sent electronically.     Last Rx: 1/28/20  LOV: 1/30/20

## 2020-02-26 NOTE — TELEPHONE ENCOUNTER
Adderall 10 mg panel pended, pls advise, thanks in advance.        Requested Prescriptions     Pending Prescriptions Disp Refills   • amphetamine-dextroamphetamine (ADDERALL) 10 MG Oral Tab 90 tablet 0     Sig: Take 1 tablet (10 mg total) by mouth 3 (three)

## 2020-03-05 ENCOUNTER — NURSE TRIAGE (OUTPATIENT)
Dept: FAMILY MEDICINE CLINIC | Facility: CLINIC | Age: 30
End: 2020-03-05

## 2020-03-05 NOTE — TELEPHONE ENCOUNTER
Action Requested: Summary for Provider     []  Critical Lab, Recommendations Needed  [] Need Additional Advice  []   FYI    []   Need Orders  [] Need Medications Sent to Pharmacy  []  Other     SUMMARY: Patient calling stating she feels a bump on the middl

## 2020-03-10 ENCOUNTER — APPOINTMENT (OUTPATIENT)
Dept: LAB | Age: 30
End: 2020-03-10
Attending: FAMILY MEDICINE
Payer: COMMERCIAL

## 2020-03-10 ENCOUNTER — OFFICE VISIT (OUTPATIENT)
Dept: FAMILY MEDICINE CLINIC | Facility: CLINIC | Age: 30
End: 2020-03-10
Payer: COMMERCIAL

## 2020-03-10 VITALS
HEART RATE: 81 BPM | HEIGHT: 60.5 IN | WEIGHT: 145 LBS | BODY MASS INDEX: 27.73 KG/M2 | SYSTOLIC BLOOD PRESSURE: 128 MMHG | TEMPERATURE: 98 F | DIASTOLIC BLOOD PRESSURE: 84 MMHG

## 2020-03-10 DIAGNOSIS — Z00.00 ROUTINE PHYSICAL EXAMINATION: ICD-10-CM

## 2020-03-10 DIAGNOSIS — R22.1 LOCALIZED SWELLING, MASS AND LUMP, NECK: ICD-10-CM

## 2020-03-10 DIAGNOSIS — R22.1 LOCALIZED SWELLING, MASS AND LUMP, NECK: Primary | ICD-10-CM

## 2020-03-10 DIAGNOSIS — F17.200 TOBACCO DEPENDENCE: ICD-10-CM

## 2020-03-10 LAB
ANION GAP SERPL CALC-SCNC: 8 MMOL/L (ref 0–18)
BUN BLD-MCNC: 12 MG/DL (ref 7–18)
BUN/CREAT SERPL: 15.8 (ref 10–20)
CALCIUM BLD-MCNC: 9.6 MG/DL (ref 8.5–10.1)
CHLORIDE SERPL-SCNC: 104 MMOL/L (ref 98–112)
CHOLEST SMN-MCNC: 225 MG/DL (ref ?–200)
CO2 SERPL-SCNC: 24 MMOL/L (ref 21–32)
CREAT BLD-MCNC: 0.76 MG/DL (ref 0.55–1.02)
GLUCOSE BLD-MCNC: 83 MG/DL (ref 70–99)
HDLC SERPL-MCNC: 40 MG/DL (ref 40–59)
LDLC SERPL CALC-MCNC: 123 MG/DL (ref ?–100)
NONHDLC SERPL-MCNC: 185 MG/DL (ref ?–130)
OSMOLALITY SERPL CALC.SUM OF ELEC: 281 MOSM/KG (ref 275–295)
PATIENT FASTING Y/N/NP: NO
PATIENT FASTING Y/N/NP: NO
POTASSIUM SERPL-SCNC: 3.7 MMOL/L (ref 3.5–5.1)
SODIUM SERPL-SCNC: 136 MMOL/L (ref 136–145)
TRIGL SERPL-MCNC: 310 MG/DL (ref 30–149)
TSI SER-ACNC: 1.07 MIU/ML (ref 0.36–3.74)
VLDLC SERPL CALC-MCNC: 62 MG/DL (ref 0–30)

## 2020-03-10 PROCEDURE — 80048 BASIC METABOLIC PNL TOTAL CA: CPT

## 2020-03-10 PROCEDURE — 99214 OFFICE O/P EST MOD 30 MIN: CPT | Performed by: FAMILY MEDICINE

## 2020-03-10 PROCEDURE — 84443 ASSAY THYROID STIM HORMONE: CPT

## 2020-03-10 PROCEDURE — 80061 LIPID PANEL: CPT

## 2020-03-10 PROCEDURE — 36415 COLL VENOUS BLD VENIPUNCTURE: CPT

## 2020-03-10 NOTE — PROGRESS NOTES
HPI:    Maggie Desouza is a 30year old female presents to clinic with concerns regarding her neck. For the past several years, patient has had swellings on both sides of her neck, where her jaw meets her neck.   Reports that she feels them swell up she ap TABLET DAILY 91 tablet 1       Allergies:    Kiwi Extract            RASH      ROS:   Review of Systems   HENT: Negative. Eyes: Negative. Respiratory: Negative. Cardiovascular: Negative. Gastrointestinal: Negative. Endocrine: Negative.     Ge understanding of information discussed. No barriers to learning observed.            Orders This Visit:  Orders Placed This Encounter      TSH W Reflex To Free T4 [E]      Meds This Visit:  Requested Prescriptions      No prescriptions requested or ordered

## 2020-03-16 ENCOUNTER — OFFICE VISIT (OUTPATIENT)
Dept: OTOLARYNGOLOGY | Facility: CLINIC | Age: 30
End: 2020-03-16
Payer: COMMERCIAL

## 2020-03-16 ENCOUNTER — TELEPHONE (OUTPATIENT)
Dept: FAMILY MEDICINE CLINIC | Facility: CLINIC | Age: 30
End: 2020-03-16

## 2020-03-16 VITALS
DIASTOLIC BLOOD PRESSURE: 99 MMHG | WEIGHT: 140 LBS | BODY MASS INDEX: 25.76 KG/M2 | TEMPERATURE: 98 F | SYSTOLIC BLOOD PRESSURE: 141 MMHG | HEIGHT: 62 IN

## 2020-03-16 DIAGNOSIS — H92.01 RIGHT EAR PAIN: ICD-10-CM

## 2020-03-16 DIAGNOSIS — R22.1 NECK MASS: Primary | ICD-10-CM

## 2020-03-16 PROCEDURE — 99243 OFF/OP CNSLTJ NEW/EST LOW 30: CPT | Performed by: OTOLARYNGOLOGY

## 2020-03-16 RX ORDER — LEVONORGESTREL AND ETHINYL ESTRADIOL 0.15-0.03
KIT ORAL
Qty: 91 TABLET | Refills: 3 | Status: SHIPPED | OUTPATIENT
Start: 2020-03-16 | End: 2021-03-12

## 2020-03-16 NOTE — TELEPHONE ENCOUNTER
Patient seen in office by Dr. Mason Archuleta, per Dr. Mason Archuleta patient has been sick. This RN went into patient room to screen patient for Covid 19. Per patient started having sore throat about 5 days ago has now improved but still a 4/10 d/t coughing.  [facility] has ha

## 2020-03-16 NOTE — PROGRESS NOTES
Thelma Chapin is a 30year old female.   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 ENMT Negative Drooling. Eyes Negative Blurred vision and vision changes. Respiratory Negative Dyspnea and wheezing.    Cardio Negative Chest Normal. Lips/teeth/gums - Normal. Tonsils - Normal. Oropharynx - Normal.   Nose/Mouth/Throat Normal Nares - Right: Normal Left: Normal. Septum -Normal  Turbinates - Right: Normal, Left: Normal.       Current Outpatient Medications:   •  INTROVALE 0.15-0.03

## 2020-03-16 NOTE — TELEPHONE ENCOUNTER
Please review; protocol failed. Last pap- 10/9/17 normal findings. Recommendation was repeat in 2 years. No repeat pap found.     LOV-1/30/20  Requested Prescriptions   Pending Prescriptions Disp Refills   • INTROVALE 0.15-0.03 MG Oral Tab [Pharmacy Med

## 2020-03-17 ENCOUNTER — TELEPHONE (OUTPATIENT)
Dept: FAMILY MEDICINE CLINIC | Facility: CLINIC | Age: 30
End: 2020-03-17

## 2020-03-17 NOTE — TELEPHONE ENCOUNTER
I don't think patient was told she was presenting with the corona virus. She was screening appropriately and given supportive measures. We don't test for [facility] in clinic.  Thanks

## 2020-03-17 NOTE — TELEPHONE ENCOUNTER
Patients mother, Frederic Storey contacted office with some concerns for her daughter, Reynaldo Rene. DANIELLEI verified. Patient was seen at [facility] office yesterday  for thyroids issues and swollen glands.  Patient also presented with fever/ cough/ chest congestion and sh

## 2020-03-17 NOTE — TELEPHONE ENCOUNTER
This message was sent to Dr. Kentrell Kumari as Kathya Ash. Patient's mother has been given triage advice by Nurse Valery Zapata. No further recommendations from Dr. Alecia Pryor. Encounter closed.

## 2020-03-19 ENCOUNTER — TELEPHONE (OUTPATIENT)
Dept: FAMILY MEDICINE CLINIC | Facility: CLINIC | Age: 30
End: 2020-03-19

## 2020-03-19 NOTE — TELEPHONE ENCOUNTER
Per patient she needs a note that she was not able to go to work from 03/16/2020 up to 03/30/2020 due to she was self quarantine for 14 days.  Patient employer need to know only that she is self quarantine for 2 weeks, but patient would like to know what wi

## 2020-03-20 RX ORDER — AZITHROMYCIN 250 MG/1
TABLET, FILM COATED ORAL
Qty: 6 TABLET | Refills: 0 | Status: SHIPPED | OUTPATIENT
Start: 2020-03-20 | End: 2020-12-03

## 2020-03-20 NOTE — TELEPHONE ENCOUNTER
Spoke with patient, (  verified ) informed of VINEET Larry's  instructions below   Patient verbalizes understanding and agrees   Will  RX

## 2020-03-20 NOTE — TELEPHONE ENCOUNTER
Dr Shabnam Lucas, for Dr Ladarius Hanson, please advise. Patient stated that she continues with the coughing, nothing is coming up, feels congested, hears a rattle. Ears popping. Has nasal drainage.  She was short of breath and had headache with mental fogginess earli

## 2020-03-20 NOTE — TELEPHONE ENCOUNTER
Patient advised to continue home treatment recommendations, was concerned she needed antibiotic, advised symptoms are viral related. Denied having any more sob or difficulty breathing no fever. Was requesting covid testing, advised does not meet criteria.

## 2020-06-08 DIAGNOSIS — Z20.822 ENCOUNTER FOR SCREENING LABORATORY TESTING FOR COVID-19 VIRUS: Primary | ICD-10-CM

## 2020-06-08 RX ORDER — DEXTROAMPHETAMINE SACCHARATE, AMPHETAMINE ASPARTATE, DEXTROAMPHETAMINE SULFATE AND AMPHETAMINE SULFATE 2.5; 2.5; 2.5; 2.5 MG/1; MG/1; MG/1; MG/1
10 TABLET ORAL 3 TIMES DAILY
Qty: 90 TABLET | Refills: 0 | Status: SHIPPED | OUTPATIENT
Start: 2020-08-08 | End: 2021-03-19

## 2020-06-08 RX ORDER — DEXTROAMPHETAMINE SACCHARATE, AMPHETAMINE ASPARTATE, DEXTROAMPHETAMINE SULFATE AND AMPHETAMINE SULFATE 2.5; 2.5; 2.5; 2.5 MG/1; MG/1; MG/1; MG/1
10 TABLET ORAL 3 TIMES DAILY
Qty: 90 TABLET | Refills: 0 | Status: SHIPPED | OUTPATIENT
Start: 2020-06-08 | End: 2021-03-19

## 2020-06-08 RX ORDER — DEXTROAMPHETAMINE SACCHARATE, AMPHETAMINE ASPARTATE, DEXTROAMPHETAMINE SULFATE AND AMPHETAMINE SULFATE 2.5; 2.5; 2.5; 2.5 MG/1; MG/1; MG/1; MG/1
10 TABLET ORAL 3 TIMES DAILY
Qty: 90 TABLET | Refills: 0 | Status: SHIPPED | OUTPATIENT
Start: 2020-07-08 | End: 2021-03-19

## 2020-06-08 NOTE — TELEPHONE ENCOUNTER
Please let patient know refill sent to pharmacy but must schedule in office visit in July, 6-month ADD follow-up

## 2020-06-08 NOTE — TELEPHONE ENCOUNTER
Spoke with pt,  verified, pt req rx ref for addeall 10 mg tablet TID. Pt also requested a lab order for covid antibody. Lab order pended. pls advise, thanks in advance.      Requested Prescriptions     Pending Prescriptions Disp Refills   • amphetami

## 2020-06-25 ENCOUNTER — APPOINTMENT (OUTPATIENT)
Dept: LAB | Age: 30
End: 2020-06-25
Attending: FAMILY MEDICINE
Payer: COMMERCIAL

## 2020-07-08 ENCOUNTER — NURSE TRIAGE (OUTPATIENT)
Dept: FAMILY MEDICINE CLINIC | Facility: CLINIC | Age: 30
End: 2020-07-08

## 2020-07-08 NOTE — TELEPHONE ENCOUNTER
Action Requested: Summary for Provider     []  Critical Lab, Recommendations Needed  [] Need Additional Advice  []   FYI    []   Need Orders  [] Need Medications Sent to Pharmacy  []  Other     SUMMARY: per patient, since May she's been feeling increasing

## 2020-07-10 NOTE — PROGRESS NOTES
HPI:    Patient ID: Mahi Giles is a 30year old female. Mahi Giles verbally consents to a Virtual/Telephone Check-In service on 07/10/20.     Duration of Service:  10 minutes    Patient has been referred to the [facility] website at [email or web address] with history of generalized anxiety disorder with panic attacks, flying phobia. Increased irritability. Recently increased alcohol. She has a lot of anxiety about furlough from her job, uncertainty with school plans.   She has seen therapist Penny Burgos

## 2020-09-15 RX ORDER — DEXTROAMPHETAMINE SACCHARATE, AMPHETAMINE ASPARTATE, DEXTROAMPHETAMINE SULFATE AND AMPHETAMINE SULFATE 2.5; 2.5; 2.5; 2.5 MG/1; MG/1; MG/1; MG/1
10 TABLET ORAL 3 TIMES DAILY
Qty: 90 TABLET | Refills: 0 | OUTPATIENT
Start: 2020-10-16 | End: 2020-11-15

## 2020-09-15 RX ORDER — DEXTROAMPHETAMINE SACCHARATE, AMPHETAMINE ASPARTATE, DEXTROAMPHETAMINE SULFATE AND AMPHETAMINE SULFATE 2.5; 2.5; 2.5; 2.5 MG/1; MG/1; MG/1; MG/1
10 TABLET ORAL 3 TIMES DAILY
Qty: 90 TABLET | Refills: 0 | OUTPATIENT
Start: 2020-11-16 | End: 2020-12-16

## 2020-09-15 RX ORDER — DEXTROAMPHETAMINE SACCHARATE, AMPHETAMINE ASPARTATE, DEXTROAMPHETAMINE SULFATE AND AMPHETAMINE SULFATE 2.5; 2.5; 2.5; 2.5 MG/1; MG/1; MG/1; MG/1
10 TABLET ORAL 3 TIMES DAILY
Qty: 90 TABLET | Refills: 0 | OUTPATIENT
Start: 2020-09-15 | End: 2020-10-15

## 2020-09-15 NOTE — TELEPHONE ENCOUNTER
Please contact patient to schedule routine physical.  Reminder she is supposed to check home blood pressures and bring readings to visit. Once this is scheduled, send back to me for refills.

## 2020-09-16 RX ORDER — DEXTROAMPHETAMINE SACCHARATE, AMPHETAMINE ASPARTATE, DEXTROAMPHETAMINE SULFATE AND AMPHETAMINE SULFATE 2.5; 2.5; 2.5; 2.5 MG/1; MG/1; MG/1; MG/1
10 TABLET ORAL 3 TIMES DAILY
Qty: 90 TABLET | Refills: 0 | Status: SHIPPED | OUTPATIENT
Start: 2020-09-16 | End: 2020-10-16

## 2020-09-16 RX ORDER — DEXTROAMPHETAMINE SACCHARATE, AMPHETAMINE ASPARTATE, DEXTROAMPHETAMINE SULFATE AND AMPHETAMINE SULFATE 2.5; 2.5; 2.5; 2.5 MG/1; MG/1; MG/1; MG/1
10 TABLET ORAL 3 TIMES DAILY
Qty: 90 TABLET | Refills: 0 | Status: SHIPPED | OUTPATIENT
Start: 2020-10-16 | End: 2020-11-15

## 2020-09-16 RX ORDER — DEXTROAMPHETAMINE SACCHARATE, AMPHETAMINE ASPARTATE, DEXTROAMPHETAMINE SULFATE AND AMPHETAMINE SULFATE 2.5; 2.5; 2.5; 2.5 MG/1; MG/1; MG/1; MG/1
10 TABLET ORAL 3 TIMES DAILY
Qty: 90 TABLET | Refills: 0 | Status: SHIPPED | OUTPATIENT
Start: 2020-11-15 | End: 2020-11-19

## 2020-09-16 NOTE — TELEPHONE ENCOUNTER
Advised patient of Dr. Balbir Mario  note. Patient verbalized understanding. Patient scheduled follow up appointment for 10/22/20 with . Please advise on refill for Adderall.

## 2020-11-19 RX ORDER — DEXTROAMPHETAMINE SACCHARATE, AMPHETAMINE ASPARTATE, DEXTROAMPHETAMINE SULFATE AND AMPHETAMINE SULFATE 2.5; 2.5; 2.5; 2.5 MG/1; MG/1; MG/1; MG/1
10 TABLET ORAL 3 TIMES DAILY
Qty: 90 TABLET | Refills: 0 | Status: SHIPPED | OUTPATIENT
Start: 2020-11-19 | End: 2020-12-19

## 2020-11-19 NOTE — TELEPHONE ENCOUNTER
Patient calling checking on status of refill, states this is her only day off to be able to  meds   Can it please be refilled today ? ?

## 2020-11-19 NOTE — TELEPHONE ENCOUNTER
Patient calling for Adderall refill. States [facility] in [facility] does not have medication in stock. Please re-send order to [facility] in [facility], [facility] [address] [facility] [address]).                 *RN contacted [facility], spoke with Madison Jones

## 2020-12-03 ENCOUNTER — OFFICE VISIT (OUTPATIENT)
Dept: FAMILY MEDICINE CLINIC | Facility: CLINIC | Age: 30
End: 2020-12-03
Payer: COMMERCIAL

## 2020-12-03 VITALS
WEIGHT: 142.19 LBS | TEMPERATURE: 97 F | HEIGHT: 62 IN | RESPIRATION RATE: 18 BRPM | HEART RATE: 101 BPM | BODY MASS INDEX: 26.17 KG/M2 | DIASTOLIC BLOOD PRESSURE: 84 MMHG | SYSTOLIC BLOOD PRESSURE: 138 MMHG

## 2020-12-03 DIAGNOSIS — F41.1 GENERALIZED ANXIETY DISORDER WITH PANIC ATTACKS: ICD-10-CM

## 2020-12-03 DIAGNOSIS — Z72.0 TOBACCO ABUSE: ICD-10-CM

## 2020-12-03 DIAGNOSIS — E78.5 HYPERLIPIDEMIA, UNSPECIFIED HYPERLIPIDEMIA TYPE: ICD-10-CM

## 2020-12-03 DIAGNOSIS — F41.0 GENERALIZED ANXIETY DISORDER WITH PANIC ATTACKS: ICD-10-CM

## 2020-12-03 DIAGNOSIS — Z01.419 ENCOUNTER FOR GYNECOLOGICAL EXAMINATION WITHOUT ABNORMAL FINDING: Primary | ICD-10-CM

## 2020-12-03 DIAGNOSIS — F90.0 ATTENTION DEFICIT HYPERACTIVITY DISORDER (ADHD), PREDOMINANTLY INATTENTIVE TYPE: ICD-10-CM

## 2020-12-03 PROCEDURE — 3075F SYST BP GE 130 - 139MM HG: CPT | Performed by: FAMILY MEDICINE

## 2020-12-03 PROCEDURE — 3008F BODY MASS INDEX DOCD: CPT | Performed by: FAMILY MEDICINE

## 2020-12-03 PROCEDURE — 3079F DIAST BP 80-89 MM HG: CPT | Performed by: FAMILY MEDICINE

## 2020-12-03 PROCEDURE — 99395 PREV VISIT EST AGE 18-39: CPT | Performed by: FAMILY MEDICINE

## 2020-12-03 RX ORDER — ALPRAZOLAM 0.5 MG/1
0.5 TABLET ORAL NIGHTLY PRN
Qty: 20 TABLET | Refills: 0 | Status: SHIPPED | OUTPATIENT
Start: 2020-12-03 | End: 2021-06-18

## 2020-12-03 NOTE — PROGRESS NOTES
HPI:    Patient ID: Zena Dominique is a 35year old female. HPI    Review of Systems   Constitutional: Negative. Respiratory: Negative. Cardiovascular: Negative. Skin: Negative. Neurological: Negative.     Psychiatric/Behavioral: The patient is engaged to be , no date yet. She is working as  at [employer] and going to [employer] for [employer]. Encourage regular exercise. Pap smear done today. Reviewed fasting labs from previous year.   Considering conception in about

## 2020-12-19 RX ORDER — DEXTROAMPHETAMINE SACCHARATE, AMPHETAMINE ASPARTATE, DEXTROAMPHETAMINE SULFATE AND AMPHETAMINE SULFATE 2.5; 2.5; 2.5; 2.5 MG/1; MG/1; MG/1; MG/1
10 TABLET ORAL 3 TIMES DAILY
Qty: 90 TABLET | Refills: 0 | Status: SHIPPED | OUTPATIENT
Start: 2020-12-19 | End: 2021-01-18

## 2020-12-19 RX ORDER — DEXTROAMPHETAMINE SACCHARATE, AMPHETAMINE ASPARTATE, DEXTROAMPHETAMINE SULFATE AND AMPHETAMINE SULFATE 2.5; 2.5; 2.5; 2.5 MG/1; MG/1; MG/1; MG/1
10 TABLET ORAL 3 TIMES DAILY
Qty: 90 TABLET | Refills: 0 | Status: SHIPPED | OUTPATIENT
Start: 2021-02-19 | End: 2021-03-21

## 2020-12-19 RX ORDER — DEXTROAMPHETAMINE SACCHARATE, AMPHETAMINE ASPARTATE, DEXTROAMPHETAMINE SULFATE AND AMPHETAMINE SULFATE 2.5; 2.5; 2.5; 2.5 MG/1; MG/1; MG/1; MG/1
10 TABLET ORAL 3 TIMES DAILY
Qty: 90 TABLET | Refills: 0 | OUTPATIENT
Start: 2020-12-19 | End: 2021-01-18

## 2020-12-19 RX ORDER — DEXTROAMPHETAMINE SACCHARATE, AMPHETAMINE ASPARTATE, DEXTROAMPHETAMINE SULFATE AND AMPHETAMINE SULFATE 2.5; 2.5; 2.5; 2.5 MG/1; MG/1; MG/1; MG/1
10 TABLET ORAL 3 TIMES DAILY
Qty: 90 TABLET | Refills: 0 | Status: CANCELLED | OUTPATIENT
Start: 2020-12-19 | End: 2021-01-18

## 2020-12-19 RX ORDER — DEXTROAMPHETAMINE SACCHARATE, AMPHETAMINE ASPARTATE, DEXTROAMPHETAMINE SULFATE AND AMPHETAMINE SULFATE 2.5; 2.5; 2.5; 2.5 MG/1; MG/1; MG/1; MG/1
10 TABLET ORAL 3 TIMES DAILY
Qty: 90 TABLET | Refills: 0 | Status: SHIPPED | OUTPATIENT
Start: 2021-01-19 | End: 2021-02-18

## 2020-12-19 NOTE — TELEPHONE ENCOUNTER
Patient calling reports needs her Adderall refilled and neither pharmacy has it on file for refills    Med pended for your review / approval      Staff please call pt when RX has been sent

## 2020-12-19 NOTE — TELEPHONE ENCOUNTER
Patient following up for refill of her Adderall, reports she asked for a refill at her appt on 12/3, did not know pharmacy did not have her refill.  Is requesting refill as soon as possible today, reports always has issues getting her refill and she is requ

## 2020-12-21 NOTE — TELEPHONE ENCOUNTER
I left a message on her personal answering machine that the medications were sent. .  She also viewed in [employer].     Last Read in [employer]   12/19/2020  1:21 PM by Madhu Dalton

## 2021-02-18 RX ORDER — DEXTROAMPHETAMINE SACCHARATE, AMPHETAMINE ASPARTATE, DEXTROAMPHETAMINE SULFATE AND AMPHETAMINE SULFATE 2.5; 2.5; 2.5; 2.5 MG/1; MG/1; MG/1; MG/1
10 TABLET ORAL 3 TIMES DAILY
Qty: 90 TABLET | Refills: 0 | Status: SHIPPED | OUTPATIENT
Start: 2021-02-18 | End: 2021-03-20

## 2021-03-12 RX ORDER — LEVONORGESTREL AND ETHINYL ESTRADIOL 0.15-0.03
KIT ORAL
Qty: 91 TABLET | Refills: 3 | Status: SHIPPED | OUTPATIENT
Start: 2021-03-12

## 2021-03-19 ENCOUNTER — TELEPHONE (OUTPATIENT)
Dept: FAMILY MEDICINE CLINIC | Facility: CLINIC | Age: 31
End: 2021-03-19

## 2021-03-19 RX ORDER — DEXTROAMPHETAMINE SACCHARATE, AMPHETAMINE ASPARTATE, DEXTROAMPHETAMINE SULFATE AND AMPHETAMINE SULFATE 2.5; 2.5; 2.5; 2.5 MG/1; MG/1; MG/1; MG/1
10 TABLET ORAL 3 TIMES DAILY
Qty: 90 TABLET | Refills: 0 | Status: SHIPPED | OUTPATIENT
Start: 2021-03-19 | End: 2021-04-18

## 2021-03-19 RX ORDER — DEXTROAMPHETAMINE SACCHARATE, AMPHETAMINE ASPARTATE, DEXTROAMPHETAMINE SULFATE AND AMPHETAMINE SULFATE 2.5; 2.5; 2.5; 2.5 MG/1; MG/1; MG/1; MG/1
10 TABLET ORAL 3 TIMES DAILY
Qty: 90 TABLET | Refills: 0 | OUTPATIENT
Start: 2021-03-19 | End: 2021-04-18

## 2021-03-19 RX ORDER — DEXTROAMPHETAMINE SACCHARATE, AMPHETAMINE ASPARTATE, DEXTROAMPHETAMINE SULFATE AND AMPHETAMINE SULFATE 2.5; 2.5; 2.5; 2.5 MG/1; MG/1; MG/1; MG/1
10 TABLET ORAL 3 TIMES DAILY
Qty: 90 TABLET | Refills: 0 | Status: SHIPPED | OUTPATIENT
Start: 2021-04-18 | End: 2021-05-18

## 2021-03-19 RX ORDER — DEXTROAMPHETAMINE SACCHARATE, AMPHETAMINE ASPARTATE, DEXTROAMPHETAMINE SULFATE AND AMPHETAMINE SULFATE 2.5; 2.5; 2.5; 2.5 MG/1; MG/1; MG/1; MG/1
10 TABLET ORAL 3 TIMES DAILY
Qty: 90 TABLET | Refills: 0 | Status: SHIPPED | OUTPATIENT
Start: 2021-05-18 | End: 2021-06-18

## 2021-03-19 NOTE — TELEPHONE ENCOUNTER
Filled by MD.     Requested Prescriptions     Pending Prescriptions Disp Refills   • amphetamine-dextroamphetamine (ADDERALL) 10 MG Oral Tab 90 tablet 0     Sig: Take 1 tablet (10 mg total) by mouth 3 (three) times daily.

## 2021-03-19 NOTE — TELEPHONE ENCOUNTER
•  amphetamine-dextroamphetamine (ADDERALL) 10 MG Oral Tab, Take 1 tablet (10 mg total) by mouth 3 (three) times daily. , Disp: 90 tablet, Rfl: 0

## 2021-06-18 RX ORDER — DEXTROAMPHETAMINE SACCHARATE, AMPHETAMINE ASPARTATE, DEXTROAMPHETAMINE SULFATE AND AMPHETAMINE SULFATE 2.5; 2.5; 2.5; 2.5 MG/1; MG/1; MG/1; MG/1
10 TABLET ORAL 3 TIMES DAILY
Qty: 90 TABLET | Refills: 0 | Status: SHIPPED | OUTPATIENT
Start: 2021-06-18 | End: 2021-07-18

## 2021-06-18 RX ORDER — ALPRAZOLAM 0.5 MG/1
0.5 TABLET ORAL NIGHTLY PRN
Qty: 20 TABLET | Refills: 0 | Status: SHIPPED | OUTPATIENT
Start: 2021-06-18 | End: 2022-01-11

## 2021-07-20 RX ORDER — DEXTROAMPHETAMINE SACCHARATE, AMPHETAMINE ASPARTATE, DEXTROAMPHETAMINE SULFATE AND AMPHETAMINE SULFATE 2.5; 2.5; 2.5; 2.5 MG/1; MG/1; MG/1; MG/1
10 TABLET ORAL 3 TIMES DAILY
Qty: 90 TABLET | Refills: 0 | Status: SHIPPED | OUTPATIENT
Start: 2021-09-16 | End: 2021-08-26 | Stop reason: ALTCHOICE

## 2021-07-20 RX ORDER — DEXTROAMPHETAMINE SACCHARATE, AMPHETAMINE ASPARTATE, DEXTROAMPHETAMINE SULFATE AND AMPHETAMINE SULFATE 2.5; 2.5; 2.5; 2.5 MG/1; MG/1; MG/1; MG/1
10 TABLET ORAL 3 TIMES DAILY
Qty: 90 TABLET | Refills: 0 | Status: SHIPPED | OUTPATIENT
Start: 2021-08-18 | End: 2021-09-17

## 2021-07-20 RX ORDER — DEXTROAMPHETAMINE SACCHARATE, AMPHETAMINE ASPARTATE, DEXTROAMPHETAMINE SULFATE AND AMPHETAMINE SULFATE 2.5; 2.5; 2.5; 2.5 MG/1; MG/1; MG/1; MG/1
10 TABLET ORAL 3 TIMES DAILY
Qty: 90 TABLET | Refills: 0 | Status: SHIPPED | OUTPATIENT
Start: 2021-07-20 | End: 2021-08-19

## 2021-07-20 NOTE — TELEPHONE ENCOUNTER
Patient calling refill request.   Patient requesting call once script approved    Refill Protocol Appointment Criteria  · Appointment scheduled in the past 6 months or in the next 3 months  Recent Outpatient Visits            7 months ago Encounter for gyn

## 2021-08-26 ENCOUNTER — OFFICE VISIT (OUTPATIENT)
Dept: FAMILY MEDICINE CLINIC | Facility: CLINIC | Age: 31
End: 2021-08-26
Payer: COMMERCIAL

## 2021-08-26 VITALS
SYSTOLIC BLOOD PRESSURE: 132 MMHG | WEIGHT: 145.63 LBS | DIASTOLIC BLOOD PRESSURE: 82 MMHG | RESPIRATION RATE: 18 BRPM | TEMPERATURE: 99 F | HEART RATE: 80 BPM | HEIGHT: 62 IN | BODY MASS INDEX: 26.8 KG/M2

## 2021-08-26 DIAGNOSIS — Z72.0 TOBACCO ABUSE: ICD-10-CM

## 2021-08-26 DIAGNOSIS — M54.2 ANTERIOR NECK PAIN: ICD-10-CM

## 2021-08-26 DIAGNOSIS — Z31.69 ENCOUNTER FOR PRECONCEPTION CONSULTATION: Primary | ICD-10-CM

## 2021-08-26 DIAGNOSIS — K13.0 CHEILITIS: ICD-10-CM

## 2021-08-26 DIAGNOSIS — F90.0 ATTENTION DEFICIT HYPERACTIVITY DISORDER (ADHD), PREDOMINANTLY INATTENTIVE TYPE: ICD-10-CM

## 2021-08-26 PROCEDURE — 3079F DIAST BP 80-89 MM HG: CPT | Performed by: FAMILY MEDICINE

## 2021-08-26 PROCEDURE — 99214 OFFICE O/P EST MOD 30 MIN: CPT | Performed by: FAMILY MEDICINE

## 2021-08-26 PROCEDURE — 3075F SYST BP GE 130 - 139MM HG: CPT | Performed by: FAMILY MEDICINE

## 2021-08-26 PROCEDURE — 3008F BODY MASS INDEX DOCD: CPT | Performed by: FAMILY MEDICINE

## 2021-08-26 RX ORDER — LACTIC ACID, L-, CITRIC ACID MONOHYDRATE, AND POTASSIUM BITARTRATE 90; 50; 20 MG/5G; MG/5G; MG/5G
1 GEL VAGINAL NIGHTLY PRN
Qty: 30 EACH | Refills: 3 | Status: SHIPPED | OUTPATIENT
Start: 2021-08-26

## 2021-08-26 RX ORDER — MAGNESIUM OXIDE 400 MG (241.3 MG MAGNESIUM) TABLET
800 TABLET DAILY
Qty: 30 TABLET | Refills: 0 | COMMUNITY
Start: 2021-08-26

## 2021-08-26 RX ORDER — NYSTATIN 100000 U/G
1 OINTMENT TOPICAL 3 TIMES DAILY
Qty: 30 G | Refills: 0 | Status: SHIPPED | OUTPATIENT
Start: 2021-08-26

## 2021-08-26 NOTE — PROGRESS NOTES
HPI/Subjective:   Patient ID: Thelma Chapin is a 28year old female. Patient presents to discuss multiple issues as below.     Contraception        History/Other:   Review of Systems  Current Outpatient Medications   Medication Sig Dispense Refill   • Lac preconception consultation  (primary encounter diagnosis)–patient was  5/2021. Planning on conception in about 1 year. Inquiring about smoking cessation prior to conception as below.   Also discussed importance of starting folic acid proximately 3

## 2021-09-17 ENCOUNTER — NURSE TRIAGE (OUTPATIENT)
Dept: FAMILY MEDICINE CLINIC | Facility: CLINIC | Age: 31
End: 2021-09-17

## 2021-09-17 NOTE — TELEPHONE ENCOUNTER
Mother Frederic Storey called on behalf of patient to schedule appt. Patient had two episode of rectal bleeding with BM. Not straining, no hx of hemorrhoids she is aware of. Not on menses. No abd pains. No vomiting, no diarrhea. Denied URI.  Fully vaccina

## 2021-10-22 RX ORDER — DEXTROAMPHETAMINE SACCHARATE, AMPHETAMINE ASPARTATE, DEXTROAMPHETAMINE SULFATE AND AMPHETAMINE SULFATE 2.5; 2.5; 2.5; 2.5 MG/1; MG/1; MG/1; MG/1
10 TABLET ORAL 3 TIMES DAILY
Qty: 90 TABLET | Refills: 0 | Status: SHIPPED | OUTPATIENT
Start: 2021-12-23 | End: 2022-01-22

## 2021-10-22 RX ORDER — DEXTROAMPHETAMINE SACCHARATE, AMPHETAMINE ASPARTATE, DEXTROAMPHETAMINE SULFATE AND AMPHETAMINE SULFATE 2.5; 2.5; 2.5; 2.5 MG/1; MG/1; MG/1; MG/1
10 TABLET ORAL 3 TIMES DAILY
Qty: 90 TABLET | Refills: 0 | Status: SHIPPED | OUTPATIENT
Start: 2021-10-22 | End: 2021-11-21

## 2021-10-22 RX ORDER — DEXTROAMPHETAMINE SACCHARATE, AMPHETAMINE ASPARTATE, DEXTROAMPHETAMINE SULFATE AND AMPHETAMINE SULFATE 2.5; 2.5; 2.5; 2.5 MG/1; MG/1; MG/1; MG/1
10 TABLET ORAL 3 TIMES DAILY
Qty: 90 TABLET | Refills: 0 | Status: SHIPPED | OUTPATIENT
Start: 2021-11-22 | End: 2021-12-22

## 2021-10-22 NOTE — TELEPHONE ENCOUNTER
Pt called stated she hve been out x 2 days Please review. Protocol failed/ No protocol      Requested Prescriptions   Pending Prescriptions Disp Refills    amphetamine-dextroamphetamine (ADDERALL) 10 MG Oral Tab 30 tablet 0     Sig: Take 1 tablet (10 mg tot

## 2022-01-11 RX ORDER — DEXTROAMPHETAMINE SACCHARATE, AMPHETAMINE ASPARTATE, DEXTROAMPHETAMINE SULFATE AND AMPHETAMINE SULFATE 2.5; 2.5; 2.5; 2.5 MG/1; MG/1; MG/1; MG/1
10 TABLET ORAL 3 TIMES DAILY
Qty: 90 TABLET | Refills: 0 | Status: SHIPPED | OUTPATIENT
Start: 2022-01-20 | End: 2022-02-19

## 2022-01-11 RX ORDER — DEXTROAMPHETAMINE SACCHARATE, AMPHETAMINE ASPARTATE, DEXTROAMPHETAMINE SULFATE AND AMPHETAMINE SULFATE 2.5; 2.5; 2.5; 2.5 MG/1; MG/1; MG/1; MG/1
10 TABLET ORAL 3 TIMES DAILY
Qty: 90 TABLET | Refills: 0 | Status: SHIPPED | OUTPATIENT
Start: 2022-03-21 | End: 2022-04-20

## 2022-01-11 RX ORDER — DEXTROAMPHETAMINE SACCHARATE, AMPHETAMINE ASPARTATE, DEXTROAMPHETAMINE SULFATE AND AMPHETAMINE SULFATE 2.5; 2.5; 2.5; 2.5 MG/1; MG/1; MG/1; MG/1
10 TABLET ORAL 3 TIMES DAILY
Qty: 90 TABLET | Refills: 0 | Status: SHIPPED | OUTPATIENT
Start: 2022-02-19 | End: 2022-03-21

## 2022-01-11 RX ORDER — ALPRAZOLAM 0.5 MG/1
0.5 TABLET ORAL NIGHTLY PRN
Qty: 20 TABLET | Refills: 0 | Status: SHIPPED | OUTPATIENT
Start: 2022-01-11

## 2022-01-11 NOTE — TELEPHONE ENCOUNTER
Patient states she needs a refill of alprazolam 0.5 mg oral tablets. Patient states she has 4 upcoming flights and takes medication for panic attacks before flying. Medication pended. Patient is also asking for refill of Adderall.  She will be out of

## 2022-02-28 RX ORDER — DEXTROAMPHETAMINE SACCHARATE, AMPHETAMINE ASPARTATE, DEXTROAMPHETAMINE SULFATE AND AMPHETAMINE SULFATE 2.5; 2.5; 2.5; 2.5 MG/1; MG/1; MG/1; MG/1
10 TABLET ORAL 3 TIMES DAILY
Qty: 90 TABLET | Refills: 0 | Status: SHIPPED | OUTPATIENT
Start: 2022-02-28 | End: 2022-03-30

## 2022-02-28 NOTE — TELEPHONE ENCOUNTER
Under medications, it looks like it was already sent there.   I think Stevo Jurist did a 3 month script to that [facility]

## 2022-02-28 NOTE — TELEPHONE ENCOUNTER
Patient calling, identified name and , states her local [facility] does not have [address]  In stock and needs RX sent to [facility] below     Has not  been taking her meds since was out of [place] [address], [address] [place] [address] AT [address] [place] [address], [phone], [phone]    Med pended for your review / approval  I cannot  Authorize  This  refill     Routing as Dr. Caleb Field  is out of office

## 2022-02-28 NOTE — TELEPHONE ENCOUNTER
Dr. Deandra Ritter,    The [facility] that DR. Jenny Kim sent the RX to is the one that is out of stock     ( they both happen to be on [facility])     Med pended for your review / approval

## 2022-03-22 RX ORDER — DEXTROAMPHETAMINE SACCHARATE, AMPHETAMINE ASPARTATE, DEXTROAMPHETAMINE SULFATE AND AMPHETAMINE SULFATE 2.5; 2.5; 2.5; 2.5 MG/1; MG/1; MG/1; MG/1
10 TABLET ORAL 3 TIMES DAILY
Qty: 90 TABLET | Refills: 0 | OUTPATIENT
Start: 2022-03-31 | End: 2022-04-30

## 2022-03-22 RX ORDER — VARENICLINE TARTRATE 0.5 (11)-1
0.5 KIT ORAL 2 TIMES DAILY
Qty: 1 EACH | Refills: 0 | Status: SHIPPED | OUTPATIENT
Start: 2022-03-22 | End: 2022-03-23

## 2022-03-22 RX ORDER — VARENICLINE TARTRATE 0.5 (11)-1
KIT ORAL
Qty: 53 EACH | Refills: 0 | OUTPATIENT
Start: 2022-03-22

## 2022-03-22 NOTE — TELEPHONE ENCOUNTER
Please remind patient know refills on ADD medications if not seen within the past 6 months. Please schedule. Okay to use res24 I have sent Chantix starter pack, please be sure this is what she was requesting not continuing month pack.

## 2022-03-23 ENCOUNTER — TELEPHONE (OUTPATIENT)
Dept: FAMILY MEDICINE CLINIC | Facility: CLINIC | Age: 32
End: 2022-03-23

## 2022-03-23 RX ORDER — VARENICLINE TARTRATE 0.5 (11)-1
0.5 KIT ORAL 2 TIMES DAILY
Qty: 180 EACH | Refills: 0 | Status: SHIPPED | OUTPATIENT
Start: 2022-03-23 | End: 2022-04-22

## 2022-03-24 NOTE — TELEPHONE ENCOUNTER
The patient is not returning our call or addressing her mychart message a no response letter sent in the mail

## 2022-03-26 RX ORDER — DEXTROAMPHETAMINE SACCHARATE, AMPHETAMINE ASPARTATE, DEXTROAMPHETAMINE SULFATE AND AMPHETAMINE SULFATE 2.5; 2.5; 2.5; 2.5 MG/1; MG/1; MG/1; MG/1
10 TABLET ORAL 3 TIMES DAILY
Qty: 90 TABLET | Refills: 0 | Status: SHIPPED | OUTPATIENT
Start: 2022-03-26 | End: 2022-04-25

## 2022-03-26 NOTE — TELEPHONE ENCOUNTER
Please let patient know I have sent this Rx but no further refills until 6 month recheck in office. Please schedule.

## 2022-03-26 NOTE — TELEPHONE ENCOUNTER
Patient is requesting that a new Adderrall prescription be sent to [address] [place] [address] [address] [facility]. She states she is upset that it was sent on 03/21/22 to the incorrect pharmacy as the pharmacy that it was sent to never has the medication in stock (preferred pharmacy updated). She is aware that Dr. Governor Servin is not back in the office until 03/28/22 to approve this prescription. Medication pended for your review and approval.    Patient is requesting call back when prescription has been sent to correct pharmacy.

## 2022-04-14 ENCOUNTER — OFFICE VISIT (OUTPATIENT)
Dept: FAMILY MEDICINE CLINIC | Facility: CLINIC | Age: 32
End: 2022-04-14
Payer: COMMERCIAL

## 2022-04-14 ENCOUNTER — LAB ENCOUNTER (OUTPATIENT)
Dept: LAB | Age: 32
End: 2022-04-14
Attending: FAMILY MEDICINE
Payer: COMMERCIAL

## 2022-04-14 VITALS
OXYGEN SATURATION: 100 % | BODY MASS INDEX: 25.2 KG/M2 | WEIGHT: 144 LBS | RESPIRATION RATE: 20 BRPM | SYSTOLIC BLOOD PRESSURE: 138 MMHG | DIASTOLIC BLOOD PRESSURE: 88 MMHG | HEART RATE: 76 BPM | HEIGHT: 63.25 IN

## 2022-04-14 DIAGNOSIS — M13.0 POLYARTHRITIS: ICD-10-CM

## 2022-04-14 DIAGNOSIS — Z71.6 TOBACCO ABUSE COUNSELING: ICD-10-CM

## 2022-04-14 DIAGNOSIS — F90.0 ATTENTION DEFICIT HYPERACTIVITY DISORDER (ADHD), PREDOMINANTLY INATTENTIVE TYPE: ICD-10-CM

## 2022-04-14 DIAGNOSIS — Z72.0 TOBACCO ABUSE: ICD-10-CM

## 2022-04-14 DIAGNOSIS — M13.0 POLYARTHRITIS: Primary | ICD-10-CM

## 2022-04-14 LAB
ERYTHROCYTE [SEDIMENTATION RATE] IN BLOOD: 8 MM/HR
RHEUMATOID FACT SERPL-ACNC: <10 IU/ML (ref ?–15)

## 2022-04-14 PROCEDURE — 99214 OFFICE O/P EST MOD 30 MIN: CPT | Performed by: FAMILY MEDICINE

## 2022-04-14 PROCEDURE — 85652 RBC SED RATE AUTOMATED: CPT

## 2022-04-14 PROCEDURE — 3075F SYST BP GE 130 - 139MM HG: CPT | Performed by: FAMILY MEDICINE

## 2022-04-14 PROCEDURE — 3079F DIAST BP 80-89 MM HG: CPT | Performed by: FAMILY MEDICINE

## 2022-04-14 PROCEDURE — 36415 COLL VENOUS BLD VENIPUNCTURE: CPT

## 2022-04-14 PROCEDURE — 3008F BODY MASS INDEX DOCD: CPT | Performed by: FAMILY MEDICINE

## 2022-04-14 PROCEDURE — 86431 RHEUMATOID FACTOR QUANT: CPT

## 2022-04-14 PROCEDURE — 86038 ANTINUCLEAR ANTIBODIES: CPT

## 2022-04-14 RX ORDER — VARENICLINE TARTRATE 1 MG/1
TABLET, FILM COATED ORAL
COMMUNITY
Start: 2022-03-23

## 2022-04-14 RX ORDER — DEXTROAMPHETAMINE SACCHARATE, AMPHETAMINE ASPARTATE, DEXTROAMPHETAMINE SULFATE AND AMPHETAMINE SULFATE 2.5; 2.5; 2.5; 2.5 MG/1; MG/1; MG/1; MG/1
10 TABLET ORAL 3 TIMES DAILY
Qty: 90 TABLET | Refills: 0 | Status: SHIPPED | OUTPATIENT
Start: 2022-05-15 | End: 2022-06-14

## 2022-04-14 RX ORDER — DEXTROAMPHETAMINE SACCHARATE, AMPHETAMINE ASPARTATE, DEXTROAMPHETAMINE SULFATE AND AMPHETAMINE SULFATE 2.5; 2.5; 2.5; 2.5 MG/1; MG/1; MG/1; MG/1
10 TABLET ORAL 3 TIMES DAILY
Qty: 90 TABLET | Refills: 0 | Status: SHIPPED | OUTPATIENT
Start: 2022-06-15 | End: 2022-07-15

## 2022-04-14 RX ORDER — VARENICLINE TARTRATE 0.5 MG/1
0.5 TABLET, FILM COATED ORAL 2 TIMES DAILY
COMMUNITY
Start: 2022-03-24

## 2022-04-14 RX ORDER — DEXTROAMPHETAMINE SACCHARATE, AMPHETAMINE ASPARTATE, DEXTROAMPHETAMINE SULFATE AND AMPHETAMINE SULFATE 2.5; 2.5; 2.5; 2.5 MG/1; MG/1; MG/1; MG/1
10 TABLET ORAL 3 TIMES DAILY
Qty: 90 TABLET | Refills: 0 | Status: SHIPPED | OUTPATIENT
Start: 2022-04-14 | End: 2022-05-14

## 2022-04-18 LAB — NUCLEAR IGG TITR SER IF: NEGATIVE {TITER}

## 2022-08-01 DIAGNOSIS — F90.0 ATTENTION DEFICIT HYPERACTIVITY DISORDER (ADHD), PREDOMINANTLY INATTENTIVE TYPE: ICD-10-CM

## 2022-08-01 RX ORDER — DEXTROAMPHETAMINE SACCHARATE, AMPHETAMINE ASPARTATE, DEXTROAMPHETAMINE SULFATE AND AMPHETAMINE SULFATE 2.5; 2.5; 2.5; 2.5 MG/1; MG/1; MG/1; MG/1
10 TABLET ORAL 3 TIMES DAILY
Qty: 90 TABLET | Refills: 0 | Status: SHIPPED | OUTPATIENT
Start: 2022-10-02 | End: 2022-11-01

## 2022-08-01 RX ORDER — DEXTROAMPHETAMINE SACCHARATE, AMPHETAMINE ASPARTATE, DEXTROAMPHETAMINE SULFATE AND AMPHETAMINE SULFATE 2.5; 2.5; 2.5; 2.5 MG/1; MG/1; MG/1; MG/1
10 TABLET ORAL 3 TIMES DAILY
Qty: 90 TABLET | Refills: 0 | Status: SHIPPED | OUTPATIENT
Start: 2022-08-01 | End: 2022-08-31

## 2022-08-01 RX ORDER — VARENICLINE TARTRATE 1 MG/1
TABLET, FILM COATED ORAL
Qty: 180 TABLET | Refills: 1 | Status: SHIPPED | OUTPATIENT
Start: 2022-08-01

## 2022-08-01 RX ORDER — DEXTROAMPHETAMINE SACCHARATE, AMPHETAMINE ASPARTATE, DEXTROAMPHETAMINE SULFATE AND AMPHETAMINE SULFATE 2.5; 2.5; 2.5; 2.5 MG/1; MG/1; MG/1; MG/1
10 TABLET ORAL 3 TIMES DAILY
Qty: 90 TABLET | Refills: 0 | Status: SHIPPED | OUTPATIENT
Start: 2022-09-01 | End: 2022-10-01

## 2022-08-01 RX ORDER — ALPRAZOLAM 0.5 MG/1
0.5 TABLET ORAL NIGHTLY PRN
Qty: 20 TABLET | Refills: 0 | Status: SHIPPED | OUTPATIENT
Start: 2022-08-01

## 2022-08-01 NOTE — TELEPHONE ENCOUNTER
Patient calling (name and  verified), with multiple refill requests: Adderall 10mg TID panel, varenicline 1mg    Patient also stating she is going to [place], she is asking for:  1. Alprazolam 0.5 for anxiety from flights (last prescribed 22 for same)    2.  \"Prophylactic antibiotic\" for UTI, states she is prone to getting them and has been prescribed same in past (unsure of name, was last prescribed in 2018)    Meds pended for review/approval (unsure which antibiotic to pend, if indicated)

## 2022-08-01 NOTE — TELEPHONE ENCOUNTER
Please let patient know Rx sent to pharmacy but MUST schedule with me for routine physical within the next 2 months.

## 2022-08-08 DIAGNOSIS — F90.0 ATTENTION DEFICIT HYPERACTIVITY DISORDER (ADHD), PREDOMINANTLY INATTENTIVE TYPE: ICD-10-CM

## 2022-08-08 RX ORDER — DEXTROAMPHETAMINE SACCHARATE, AMPHETAMINE ASPARTATE, DEXTROAMPHETAMINE SULFATE AND AMPHETAMINE SULFATE 2.5; 2.5; 2.5; 2.5 MG/1; MG/1; MG/1; MG/1
10 TABLET ORAL 3 TIMES DAILY
Qty: 90 TABLET | Refills: 0 | Status: CANCELLED | OUTPATIENT
Start: 2022-10-02 | End: 2022-11-01

## 2022-08-08 RX ORDER — DEXTROAMPHETAMINE SACCHARATE, AMPHETAMINE ASPARTATE, DEXTROAMPHETAMINE SULFATE AND AMPHETAMINE SULFATE 2.5; 2.5; 2.5; 2.5 MG/1; MG/1; MG/1; MG/1
10 TABLET ORAL 3 TIMES DAILY
Qty: 90 TABLET | Refills: 0 | Status: SHIPPED | OUTPATIENT
Start: 2022-08-08 | End: 2022-09-07

## 2022-08-08 RX ORDER — DEXTROAMPHETAMINE SACCHARATE, AMPHETAMINE ASPARTATE, DEXTROAMPHETAMINE SULFATE AND AMPHETAMINE SULFATE 2.5; 2.5; 2.5; 2.5 MG/1; MG/1; MG/1; MG/1
10 TABLET ORAL 3 TIMES DAILY
Qty: 90 TABLET | Refills: 0 | Status: SHIPPED | OUTPATIENT
Start: 2022-09-08 | End: 2022-10-08

## 2022-08-08 RX ORDER — DEXTROAMPHETAMINE SACCHARATE, AMPHETAMINE ASPARTATE, DEXTROAMPHETAMINE SULFATE AND AMPHETAMINE SULFATE 2.5; 2.5; 2.5; 2.5 MG/1; MG/1; MG/1; MG/1
10 TABLET ORAL DAILY
Qty: 90 TABLET | Refills: 0 | Status: SHIPPED | OUTPATIENT
Start: 2022-10-09 | End: 2022-11-08

## 2022-08-08 NOTE — TELEPHONE ENCOUNTER
Sent to [facility] provider for Dr. Yemi Madsen  1.) Pt called states she was prescribed Adderall last week by Dr Yemi Madsen but [facility]  was unable to fill as they are out of stock. States she has been without medication for past week and needs it sent to [facility] in [address] pharmacy. [facility] unable to transfer script since it is controlled substance. 2.) See 8/1/22 pt at that time prescribed prophylactic antibiotic for chronic UTI's. States she is going to [facility] soon for 2 weeks, is concerned about getting UTI while there, states they quickly turn into kidney infections for her.

## 2022-08-08 NOTE — TELEPHONE ENCOUNTER
Patient (name and  verified) calling again regarding her medication refill for adderall. Patient states she received a message her request was denied on mychart. Patient needs the medication sent to [facility]. Pharmacy and allergies verified. Order pended for approval/review.

## 2022-08-08 NOTE — TELEPHONE ENCOUNTER
8/1 4:42 PM  Note  Please let patient know Rx sent to pharmacy but MUST schedule with me for routine physical within the next 2 months.  Dr. Scarlet Umaña

## 2022-08-08 NOTE — TELEPHONE ENCOUNTER
Called Ad- confirmed they are out of Adderall- cancelled panel. Pended 90 day panel to updated pharmacy.

## 2022-10-12 ENCOUNTER — TELEPHONE (OUTPATIENT)
Dept: FAMILY MEDICINE CLINIC | Facility: CLINIC | Age: 32
End: 2022-10-12

## 2022-10-12 RX ORDER — DEXTROAMPHETAMINE SACCHARATE, AMPHETAMINE ASPARTATE, DEXTROAMPHETAMINE SULFATE AND AMPHETAMINE SULFATE 2.5; 2.5; 2.5; 2.5 MG/1; MG/1; MG/1; MG/1
10 TABLET ORAL 3 TIMES DAILY
Qty: 90 TABLET | Refills: 0 | Status: SHIPPED | OUTPATIENT
Start: 2022-10-12 | End: 2022-11-11

## 2022-10-12 NOTE — TELEPHONE ENCOUNTER
Verified name and . Patient calling to state that prescription for Adderall that was sent on 10/09/22 was written in correctly- states to take once daily when she takes it 3 times daily (it was re-sent to [facility] on 10/09/22 because [facility] did not have medication in stock). She is requesting that corrected prescription be re-sent to [facility].  She states she is out of the medication now.     Prescription pended for your approval.    Warm transfer to [facility] agent, Landon Maldonado, to schedule annual physical.

## 2022-10-12 NOTE — TELEPHONE ENCOUNTER
Pharmacy calling regarding amphetamine-dextroamphetamine (ADDERALL) 10 MG Oral Tab  One of the refills for October was incorrect, it was supposed to say take one tablet 3 times a day,  please re-send

## 2022-10-12 NOTE — TELEPHONE ENCOUNTER
amphetamine-dextroamphetamine (ADDERALL) 10 MG Oral Tab 90 tablet 0 10/12/2022 11/11/2022    Sig - Route: Take 1 tablet (10 mg total) by mouth 3 (three) times daily. - Oral    Sent to pharmacy as:  Amphetamine-Dextroamphetamine 10 MG Oral Tablet (Adderall)    Earliest Fill Date: 10/12/2022    E-Prescribing Status: Receipt confirmed by pharmacy (10/12/2022 12:51 PM CDT)      Pharmacy has correct script

## 2022-10-27 ENCOUNTER — OFFICE VISIT (OUTPATIENT)
Dept: FAMILY MEDICINE CLINIC | Facility: CLINIC | Age: 32
End: 2022-10-27
Payer: COMMERCIAL

## 2022-10-27 ENCOUNTER — LAB ENCOUNTER (OUTPATIENT)
Dept: LAB | Age: 32
End: 2022-10-27
Attending: FAMILY MEDICINE
Payer: COMMERCIAL

## 2022-10-27 VITALS
HEIGHT: 63 IN | RESPIRATION RATE: 16 BRPM | DIASTOLIC BLOOD PRESSURE: 83 MMHG | WEIGHT: 133.5 LBS | HEART RATE: 89 BPM | BODY MASS INDEX: 23.65 KG/M2 | SYSTOLIC BLOOD PRESSURE: 126 MMHG

## 2022-10-27 DIAGNOSIS — M21.611 BILATERAL BUNIONS: ICD-10-CM

## 2022-10-27 DIAGNOSIS — Z00.00 ROUTINE HEALTH MAINTENANCE: ICD-10-CM

## 2022-10-27 DIAGNOSIS — Z00.00 ROUTINE HEALTH MAINTENANCE: Primary | ICD-10-CM

## 2022-10-27 DIAGNOSIS — M21.612 BILATERAL BUNIONS: ICD-10-CM

## 2022-10-27 LAB
ALBUMIN SERPL-MCNC: 3.9 G/DL (ref 3.4–5)
ALBUMIN/GLOB SERPL: 1 {RATIO} (ref 1–2)
ALP LIVER SERPL-CCNC: 98 U/L
ALT SERPL-CCNC: 35 U/L
ANION GAP SERPL CALC-SCNC: 11 MMOL/L (ref 0–18)
AST SERPL-CCNC: 23 U/L (ref 15–37)
BILIRUB SERPL-MCNC: 0.5 MG/DL (ref 0.1–2)
BUN BLD-MCNC: 12 MG/DL (ref 7–18)
BUN/CREAT SERPL: 15.2 (ref 10–20)
CALCIUM BLD-MCNC: 9.3 MG/DL (ref 8.5–10.1)
CHLORIDE SERPL-SCNC: 102 MMOL/L (ref 98–112)
CHOLEST SERPL-MCNC: 212 MG/DL (ref ?–200)
CO2 SERPL-SCNC: 24 MMOL/L (ref 21–32)
CREAT BLD-MCNC: 0.79 MG/DL
DEPRECATED RDW RBC AUTO: 43.1 FL (ref 35.1–46.3)
ERYTHROCYTE [DISTWIDTH] IN BLOOD BY AUTOMATED COUNT: 12.5 % (ref 11–15)
FASTING PATIENT LIPID ANSWER: NO
FASTING STATUS PATIENT QL REPORTED: NO
GFR SERPLBLD BASED ON 1.73 SQ M-ARVRAT: 102 ML/MIN/1.73M2 (ref 60–?)
GLOBULIN PLAS-MCNC: 3.9 G/DL (ref 2.8–4.4)
GLUCOSE BLD-MCNC: 94 MG/DL (ref 70–99)
HCT VFR BLD AUTO: 40.4 %
HDLC SERPL-MCNC: 57 MG/DL (ref 40–59)
HGB BLD-MCNC: 12.9 G/DL
LDLC SERPL CALC-MCNC: 128 MG/DL (ref ?–100)
MCH RBC QN AUTO: 30.1 PG (ref 26–34)
MCHC RBC AUTO-ENTMCNC: 31.9 G/DL (ref 31–37)
MCV RBC AUTO: 94.2 FL
NONHDLC SERPL-MCNC: 155 MG/DL (ref ?–130)
OSMOLALITY SERPL CALC.SUM OF ELEC: 284 MOSM/KG (ref 275–295)
PLATELET # BLD AUTO: 405 10(3)UL (ref 150–450)
POTASSIUM SERPL-SCNC: 3.3 MMOL/L (ref 3.5–5.1)
PROT SERPL-MCNC: 7.8 G/DL (ref 6.4–8.2)
RBC # BLD AUTO: 4.29 X10(6)UL
SODIUM SERPL-SCNC: 137 MMOL/L (ref 136–145)
TRIGL SERPL-MCNC: 155 MG/DL (ref 30–149)
VLDLC SERPL CALC-MCNC: 28 MG/DL (ref 0–30)
WBC # BLD AUTO: 7.8 X10(3) UL (ref 4–11)

## 2022-10-27 PROCEDURE — 36415 COLL VENOUS BLD VENIPUNCTURE: CPT

## 2022-10-27 PROCEDURE — 3074F SYST BP LT 130 MM HG: CPT | Performed by: FAMILY MEDICINE

## 2022-10-27 PROCEDURE — 80061 LIPID PANEL: CPT

## 2022-10-27 PROCEDURE — 90686 IIV4 VACC NO PRSV 0.5 ML IM: CPT | Performed by: FAMILY MEDICINE

## 2022-10-27 PROCEDURE — 85027 COMPLETE CBC AUTOMATED: CPT

## 2022-10-27 PROCEDURE — 3079F DIAST BP 80-89 MM HG: CPT | Performed by: FAMILY MEDICINE

## 2022-10-27 PROCEDURE — 90471 IMMUNIZATION ADMIN: CPT | Performed by: FAMILY MEDICINE

## 2022-10-27 PROCEDURE — 80053 COMPREHEN METABOLIC PANEL: CPT

## 2022-10-27 PROCEDURE — 99395 PREV VISIT EST AGE 18-39: CPT | Performed by: FAMILY MEDICINE

## 2022-10-27 PROCEDURE — 3008F BODY MASS INDEX DOCD: CPT | Performed by: FAMILY MEDICINE

## 2022-10-28 NOTE — PROGRESS NOTES
Noted normal results  [address] [facility] lab results are normal, except your potassium level is just slightly low. Make sure you are eating a lot of potassium rich foods in your diet. This would be foods like bananas, avocados, navy beans, avocados, and greens. if you have any questions please feel free to message me.     Sarah Bush MD

## 2022-11-07 ENCOUNTER — NURSE TRIAGE (OUTPATIENT)
Dept: FAMILY MEDICINE CLINIC | Facility: CLINIC | Age: 32
End: 2022-11-07

## 2022-11-08 ENCOUNTER — TELEMEDICINE (OUTPATIENT)
Dept: FAMILY MEDICINE CLINIC | Facility: CLINIC | Age: 32
End: 2022-11-08

## 2022-11-08 ENCOUNTER — TELEPHONE (OUTPATIENT)
Dept: FAMILY MEDICINE CLINIC | Facility: CLINIC | Age: 32
End: 2022-11-08

## 2022-11-08 DIAGNOSIS — Z32.01 POSITIVE PREGNANCY TEST: Primary | ICD-10-CM

## 2022-11-08 PROCEDURE — 99213 OFFICE O/P EST LOW 20 MIN: CPT | Performed by: FAMILY MEDICINE

## 2022-11-08 NOTE — TELEPHONE ENCOUNTER
I called and spoke with the patient. Apologized which patient accepted but then hung up on me. [employer] message sent as well with first trimester precautions.

## 2022-11-08 NOTE — TELEPHONE ENCOUNTER
Called patient per Dr. Elyse Guillory request, confirmed name and . Patient expressed same concerns as documented below. Asked patient if she may have overheard Dr. Alveda Mortimer dictating the visit note. Patient is unsure. Support provided and recommendation from Dr. Alveda Mortimer to see ob-gyne advised. Patient verbalized understanding and agrees to the plan stating she has ob-gyne appointment next week. Patient states that she will be waiting for a call from Dr. Alveda Mortimer.

## 2022-11-08 NOTE — TELEPHONE ENCOUNTER
Per patient, she just had her video/telephone visit with Dr Lezlie Denver. States that Dr Lezlie Denver thought she hung up but she heard that Dr Lezlie Denver states that she is   \"CRAZY\" and \"making fun of me behind my back\". Requesting to talk to Dr Lezlie Denver. RN apologize to the patient, placed on hold while RN is contacting Dr Lezlie Denver. Per Dr Lezlie Denver, she is currently with the patient and she will call her back later. Informed patient and agrees, contact number verified. [employer] number  provided [phone]. RN noticed that the the number is not the right one. Called patient and correct PR[employer] line number given [phone]. Per chart review, there is no visit note encounter yet.

## 2022-11-17 ENCOUNTER — TELEPHONE (OUTPATIENT)
Dept: FAMILY MEDICINE CLINIC | Facility: CLINIC | Age: 32
End: 2022-11-17

## 2022-11-17 RX ORDER — DEXTROAMPHETAMINE SACCHARATE, AMPHETAMINE ASPARTATE, DEXTROAMPHETAMINE SULFATE AND AMPHETAMINE SULFATE 2.5; 2.5; 2.5; 2.5 MG/1; MG/1; MG/1; MG/1
10 TABLET ORAL 3 TIMES DAILY
Qty: 90 TABLET | Refills: 0 | Status: SHIPPED | OUTPATIENT
Start: 2022-11-17 | End: 2022-12-17

## 2022-11-17 NOTE — TELEPHONE ENCOUNTER
Patient stated that found out that she is pregnant and saw her ob/gyne. Had an ultrasound and the baby is healthy. Ob indicated to patient that ok for patient to be on adderall 10mg 3 times daily so patient wanted to get a refill but will try and take it 1 or 2 times daily. States needs it because struggling at work. Wanted to speak to Dr Roxane Zayas regarding the pregnancy. Please call patient at [phone]. Transferred patient to medical records to get records transferred to [facility].

## 2022-12-23 NOTE — TELEPHONE ENCOUNTER
Received call from patient requesting refills. Patient states she is no longer pregnant. Had a miscarriage about 2 weeks ago. Please review pended scripts and sign if appropriate.

## 2022-12-24 ENCOUNTER — TELEPHONE (OUTPATIENT)
Dept: FAMILY MEDICINE CLINIC | Facility: CLINIC | Age: 32
End: 2022-12-24

## 2022-12-24 NOTE — TELEPHONE ENCOUNTER
Patient called (identified name and ),   States Dr Kimberley Schultz just tried to call her. She is at work, can be called at [phone]. Will try to keep cell phone near. Otherwise best time to call is between 2 pm and 3 pm today.

## 2022-12-26 RX ORDER — DEXTROAMPHETAMINE SACCHARATE, AMPHETAMINE ASPARTATE, DEXTROAMPHETAMINE SULFATE AND AMPHETAMINE SULFATE 2.5; 2.5; 2.5; 2.5 MG/1; MG/1; MG/1; MG/1
10 TABLET ORAL 3 TIMES DAILY
Qty: 90 TABLET | Refills: 0 | Status: SHIPPED | OUTPATIENT
Start: 2023-02-23 | End: 2023-03-25

## 2022-12-26 RX ORDER — DEXTROAMPHETAMINE SACCHARATE, AMPHETAMINE ASPARTATE, DEXTROAMPHETAMINE SULFATE AND AMPHETAMINE SULFATE 2.5; 2.5; 2.5; 2.5 MG/1; MG/1; MG/1; MG/1
10 TABLET ORAL 3 TIMES DAILY
Qty: 90 TABLET | Refills: 0 | Status: SHIPPED | OUTPATIENT
Start: 2022-12-26 | End: 2023-01-25

## 2022-12-26 RX ORDER — DEXTROAMPHETAMINE SACCHARATE, AMPHETAMINE ASPARTATE, DEXTROAMPHETAMINE SULFATE AND AMPHETAMINE SULFATE 2.5; 2.5; 2.5; 2.5 MG/1; MG/1; MG/1; MG/1
10 TABLET ORAL 3 TIMES DAILY
Qty: 90 TABLET | Refills: 0 | Status: SHIPPED | OUTPATIENT
Start: 2023-01-23 | End: 2023-02-22

## 2022-12-26 RX ORDER — ALPRAZOLAM 0.5 MG/1
0.5 TABLET ORAL NIGHTLY PRN
Qty: 20 TABLET | Refills: 0 | Status: SHIPPED | OUTPATIENT
Start: 2022-12-26

## 2023-04-03 NOTE — TELEPHONE ENCOUNTER
Patient requesting a refill on adderall 10mg 1 tablet 3 times daily#90. Pharmacy was on back order before that's why she was prescribed the 20mg. Rx's pended.

## 2023-04-05 RX ORDER — DEXTROAMPHETAMINE SACCHARATE, AMPHETAMINE ASPARTATE, DEXTROAMPHETAMINE SULFATE AND AMPHETAMINE SULFATE 2.5; 2.5; 2.5; 2.5 MG/1; MG/1; MG/1; MG/1
10 TABLET ORAL 3 TIMES DAILY
Qty: 90 TABLET | Refills: 0 | Status: SHIPPED | OUTPATIENT
Start: 2023-06-04 | End: 2023-07-04

## 2023-04-05 RX ORDER — DEXTROAMPHETAMINE SACCHARATE, AMPHETAMINE ASPARTATE, DEXTROAMPHETAMINE SULFATE AND AMPHETAMINE SULFATE 2.5; 2.5; 2.5; 2.5 MG/1; MG/1; MG/1; MG/1
10 TABLET ORAL 3 TIMES DAILY
Qty: 90 TABLET | Refills: 0 | Status: SHIPPED | OUTPATIENT
Start: 2023-04-05 | End: 2023-05-05

## 2023-04-05 RX ORDER — DEXTROAMPHETAMINE SACCHARATE, AMPHETAMINE ASPARTATE, DEXTROAMPHETAMINE SULFATE AND AMPHETAMINE SULFATE 2.5; 2.5; 2.5; 2.5 MG/1; MG/1; MG/1; MG/1
10 TABLET ORAL 3 TIMES DAILY
Qty: 90 TABLET | Refills: 0 | Status: SHIPPED | OUTPATIENT
Start: 2023-05-04 | End: 2023-06-03

## 2023-06-28 RX ORDER — DEXTROAMPHETAMINE SACCHARATE, AMPHETAMINE ASPARTATE, DEXTROAMPHETAMINE SULFATE AND AMPHETAMINE SULFATE 2.5; 2.5; 2.5; 2.5 MG/1; MG/1; MG/1; MG/1
10 TABLET ORAL 3 TIMES DAILY
Qty: 90 TABLET | Refills: 0 | OUTPATIENT
Start: 2023-06-28 | End: 2023-07-28

## 2023-06-28 RX ORDER — ALPRAZOLAM 0.5 MG/1
0.5 TABLET ORAL NIGHTLY PRN
Qty: 20 TABLET | Refills: 0 | OUTPATIENT
Start: 2023-06-28

## 2023-06-30 ENCOUNTER — TELEPHONE (OUTPATIENT)
Dept: FAMILY MEDICINE CLINIC | Facility: CLINIC | Age: 33
End: 2023-06-30

## 2023-06-30 PROBLEM — T75.3XXA MOTION SICKNESS: Status: ACTIVE | Noted: 2023-06-30

## 2023-06-30 PROBLEM — F41.9 ANXIETY: Status: ACTIVE | Noted: 2023-06-30

## 2023-08-15 RX ORDER — DEXTROAMPHETAMINE SACCHARATE, AMPHETAMINE ASPARTATE, DEXTROAMPHETAMINE SULFATE AND AMPHETAMINE SULFATE 2.5; 2.5; 2.5; 2.5 MG/1; MG/1; MG/1; MG/1
10 TABLET ORAL 3 TIMES DAILY
Qty: 90 TABLET | Refills: 0 | Status: SHIPPED | OUTPATIENT
Start: 2023-08-15 | End: 2023-09-14

## 2023-08-15 RX ORDER — DEXTROAMPHETAMINE SACCHARATE, AMPHETAMINE ASPARTATE, DEXTROAMPHETAMINE SULFATE AND AMPHETAMINE SULFATE 2.5; 2.5; 2.5; 2.5 MG/1; MG/1; MG/1; MG/1
10 TABLET ORAL 3 TIMES DAILY
Qty: 90 TABLET | Refills: 0 | Status: SHIPPED | OUTPATIENT
Start: 2023-09-14 | End: 2023-10-14

## 2023-08-15 RX ORDER — DEXTROAMPHETAMINE SACCHARATE, AMPHETAMINE ASPARTATE, DEXTROAMPHETAMINE SULFATE AND AMPHETAMINE SULFATE 2.5; 2.5; 2.5; 2.5 MG/1; MG/1; MG/1; MG/1
10 TABLET ORAL 3 TIMES DAILY
Qty: 90 TABLET | Refills: 0 | Status: SHIPPED | OUTPATIENT
Start: 2023-10-15 | End: 2023-11-14

## 2023-11-09 RX ORDER — DEXTROAMPHETAMINE SACCHARATE, AMPHETAMINE ASPARTATE, DEXTROAMPHETAMINE SULFATE AND AMPHETAMINE SULFATE 2.5; 2.5; 2.5; 2.5 MG/1; MG/1; MG/1; MG/1
10 TABLET ORAL 3 TIMES DAILY
Qty: 90 TABLET | Refills: 0 | Status: CANCELLED | OUTPATIENT
Start: 2023-11-09 | End: 2023-12-09

## 2023-11-10 NOTE — TELEPHONE ENCOUNTER
Please review. Protocol failed or has no protocol. Requested Prescriptions   Pending Prescriptions Disp Refills    amphetamine-dextroamphetamine (ADDERALL) 10 MG Oral Tab 90 tablet 0     Sig: Take 1 tablet (10 mg total) by mouth 3 (three) times daily. There is no refill protocol information for this order       amphetamine-dextroamphetamine (ADDERALL) 10 MG Oral Tab 90 tablet 0     Sig: Take 1 tablet (10 mg total) by mouth 3 (three) times daily. There is no refill protocol information for this order       amphetamine-dextroamphetamine (ADDERALL) 10 MG Oral Tab 90 tablet 0     Sig: Take 1 tablet (10 mg total) by mouth 3 (three) times daily.        There is no refill protocol information for this order          Recent Outpatient Visits              4 months ago Attention deficit hyperactivity disorder (ADHD), predominantly inattentive type    [facility], [address] [place] [address] KATY Elizabeth    Telemedicine    1 year ago Positive pregnancy test    [facility] [address] Leo Horan DO    Telemedicine    1 year ago Routine health maintenance    Tigre Mendiola MD    Office Visit    1 year ago Argelia Figueroa MD    Office Visit    2 years ago Encounter for preconception consultation    Shane Galarza [address], Calleen Gosselin, MD    Office Visit

## 2023-11-13 RX ORDER — DEXTROAMPHETAMINE SACCHARATE, AMPHETAMINE ASPARTATE, DEXTROAMPHETAMINE SULFATE AND AMPHETAMINE SULFATE 2.5; 2.5; 2.5; 2.5 MG/1; MG/1; MG/1; MG/1
10 TABLET ORAL 3 TIMES DAILY
Qty: 90 TABLET | Refills: 0 | Status: SHIPPED | OUTPATIENT
Start: 2023-11-14 | End: 2023-12-14

## 2023-11-13 RX ORDER — DEXTROAMPHETAMINE SACCHARATE, AMPHETAMINE ASPARTATE, DEXTROAMPHETAMINE SULFATE AND AMPHETAMINE SULFATE 2.5; 2.5; 2.5; 2.5 MG/1; MG/1; MG/1; MG/1
10 TABLET ORAL 3 TIMES DAILY
Qty: 90 TABLET | Refills: 0 | Status: SHIPPED | OUTPATIENT
Start: 2024-01-13 | End: 2024-02-12

## 2023-11-13 RX ORDER — DEXTROAMPHETAMINE SACCHARATE, AMPHETAMINE ASPARTATE, DEXTROAMPHETAMINE SULFATE AND AMPHETAMINE SULFATE 2.5; 2.5; 2.5; 2.5 MG/1; MG/1; MG/1; MG/1
10 TABLET ORAL 3 TIMES DAILY
Qty: 90 TABLET | Refills: 0 | Status: SHIPPED | OUTPATIENT
Start: 2023-12-14 | End: 2024-01-13

## 2023-11-13 NOTE — TELEPHONE ENCOUNTER
Please review; protocol failed. Please advise-patient running low on medication   Requested Prescriptions   Pending Prescriptions Disp Refills    amphetamine-dextroamphetamine (ADDERALL) 10 MG Oral Tab 90 tablet 0     Sig: Take 1 tablet (10 mg total) by mouth 3 (three) times daily. There is no refill protocol information for this order       amphetamine-dextroamphetamine (ADDERALL) 10 MG Oral Tab 90 tablet 0     Sig: Take 1 tablet (10 mg total) by mouth 3 (three) times daily. There is no refill protocol information for this order       amphetamine-dextroamphetamine (ADDERALL) 10 MG Oral Tab 90 tablet 0     Sig: Take 1 tablet (10 mg total) by mouth 3 (three) times daily.        There is no refill protocol information for this order        Recent Outpatient Visits              4 months ago Attention deficit hyperactivity disorder (ADHD), predominantly inattentive type    [facility], [address] [place] [address] KATY Castaneda    Telemedicine    1 year ago Positive pregnancy test    Roxanne Rico DO    Telemedicine    1 year ago Routine health maintenance    [address], Kaur Mayfield MD    Office Visit    1 year ago Roxann Pierre MD    Office Visit    2 years ago Encounter for preconception consultation    [address], [address], [address] [facility] MD Jane    Office Visit

## 2023-11-14 ENCOUNTER — TELEPHONE (OUTPATIENT)
Dept: FAMILY MEDICINE CLINIC | Facility: CLINIC | Age: 33
End: 2023-11-14

## 2023-11-14 NOTE — TELEPHONE ENCOUNTER
Patient's prescription for Adderall was refused due to duplicate requests, but she is out of medication and never received it from the pharmacy.

## 2023-11-14 NOTE — TELEPHONE ENCOUNTER
Called patient's [facility] pharmacy, confirmed name and .    As reflected on her chart, she has a refill available and she just needs to request it from them. These are not duplicate requests; This is a 90-day panel for her convenience.

## 2023-11-14 NOTE — TELEPHONE ENCOUNTER
Called patient to explain below and she had already spoken to someone because this is a duplicate encounter.

## 2023-12-14 ENCOUNTER — TELEPHONE (OUTPATIENT)
Dept: FAMILY MEDICINE CLINIC | Facility: CLINIC | Age: 33
End: 2023-12-14

## 2023-12-14 NOTE — TELEPHONE ENCOUNTER
NURSE ANTICOAGULATION PHONE MESSAGE    Phone message left with Tara Lopes 1948  today regarding 2 week INR results  No outpatient medications have been marked as taking for the 6/21/19 encounter (Anti-Coag) with Paolo BRISCOE MD.       INR (no units)   Date Value   03/22/2019 3.0     INR-POC (no units)   Date Value   03/28/2019 2.6     INR CAPILLARY (no units)   Date Value   06/21/2019 1.8     GOAL RANGE: 2.0-3.0    ALLERGIES:   Allergen Reactions   • Sulfa Drugs Cross Reactors GI UPSET   • Metoprolol Succinate Er INSOMNIA, ANXIETY and PRURITUS       Patient Active Problem List   Diagnosis   • Diabetes mellitus, type 2 (CMS/HCC)   • HTN (hypertension)   • Asthma   • Colon polyps   • Diverticulosis of colon   • Family history of colon cancer   • Anxiety   • Diabetes mellitus (CMS/HCC)   • Acute right-sided low back pain with right-sided sciatica   • Long term current use of anticoagulant   • Encounter for therapeutic drug level monitoring   • Atrial fibrillation, unspecified type (CMS/HCC)   • Atrial tachycardia (CMS/HCC)   • Essential hypertension   • Paroxysmal atrial flutter (CMS/HCC)/ CHADSVASC SCORE 4 on warfarin   • Chest pain       PATIENT REPORTED CHANGES: Patient to call with any medication/diet changes or concerns.    NURSE DOSING ADJUSTMENTS AND EDUCATION: 10 mg warfarin today, 06/21/19, then increase dose.         Patient to recheck INR in 2 weeks, sooner if changes or concerns develop.  Warfarin management done via phone message according to protocol. Importance of returning call with any medication, diet changes or concerns stressed on message. Patient also to call with any concerns or questions regarding warfarin dosing or phone message. Dr Delgadillo available today.      Marissa Almaguer, RN   3 monnt was sent in November.   Patient has refill pending for December and January

## 2023-12-14 NOTE — TELEPHONE ENCOUNTER
Patient needs a refill on:    amphetamine-dextroamphetamine (ADDERALL) 10 MG Oral Tab     She has a telehealth appointment scheduled on 12/19/23. When she refilled the medication last time, she only received a one month supply, not a three month supply.

## 2024-03-08 DIAGNOSIS — F90.9 ATTENTION DEFICIT HYPERACTIVITY DISORDER (ADHD), UNSPECIFIED ADHD TYPE: ICD-10-CM

## 2024-03-08 RX ORDER — DEXTROAMPHETAMINE SACCHARATE, AMPHETAMINE ASPARTATE, DEXTROAMPHETAMINE SULFATE AND AMPHETAMINE SULFATE 2.5; 2.5; 2.5; 2.5 MG/1; MG/1; MG/1; MG/1
10 TABLET ORAL 3 TIMES DAILY
Qty: 90 TABLET | Refills: 0 | Status: SHIPPED | OUTPATIENT
Start: 2024-03-13

## 2024-03-08 NOTE — TELEPHONE ENCOUNTER
Per patient she needs refill on her Adderall. Please send to her pharmacy on file verified.    Current Outpatient Medications   Medication Sig Dispense Refill    amphetamine-dextroamphetamine 10 MG Oral Tab Take 1 tablet (10 mg total) by mouth 3 (three) times daily. 90 tablet 0     
Please review; Protocol Failed/ no protocol.  Rx Pended, authorize if appropriate    Requested Prescriptions   Pending Prescriptions Disp Refills    amphetamine-dextroamphetamine 10 MG Oral Tab 90 tablet 0     Sig: Take 1 tablet (10 mg total) by mouth 3 (three) times daily.       Controlled Substance Medication Failed - 3/8/2024  2:20 PM        Failed - This medication is a controlled substance - forward to provider to refill           
Routing for protocol  
Was discussed during vitis 12/19/24. Patient needs appointment for any additional refills. 1 refill given until appointment   
hypertension

## 2024-03-13 DIAGNOSIS — F90.9 ATTENTION DEFICIT HYPERACTIVITY DISORDER (ADHD), UNSPECIFIED ADHD TYPE: ICD-10-CM

## 2024-03-14 RX ORDER — DEXTROAMPHETAMINE SACCHARATE, AMPHETAMINE ASPARTATE, DEXTROAMPHETAMINE SULFATE AND AMPHETAMINE SULFATE 2.5; 2.5; 2.5; 2.5 MG/1; MG/1; MG/1; MG/1
10 TABLET ORAL 3 TIMES DAILY
Qty: 90 TABLET | Refills: 0 | Status: CANCELLED | OUTPATIENT
Start: 2024-03-14

## 2024-03-15 RX ORDER — DEXTROAMPHETAMINE SACCHARATE, AMPHETAMINE ASPARTATE, DEXTROAMPHETAMINE SULFATE AND AMPHETAMINE SULFATE 2.5; 2.5; 2.5; 2.5 MG/1; MG/1; MG/1; MG/1
10 TABLET ORAL 3 TIMES DAILY
Qty: 90 TABLET | Refills: 0 | Status: SHIPPED | OUTPATIENT
Start: 2024-03-15

## 2024-03-15 NOTE — TELEPHONE ENCOUNTER
Patient called, she's been trying to get the medication, has called around, requested it to be sent to the pended pharmacy as they had just enough in stock. Please send asap per her request.

## 2024-04-04 ENCOUNTER — OFFICE VISIT (OUTPATIENT)
Dept: FAMILY MEDICINE CLINIC | Facility: CLINIC | Age: 34
End: 2024-04-04

## 2024-04-04 ENCOUNTER — LAB ENCOUNTER (OUTPATIENT)
Dept: LAB | Age: 34
End: 2024-04-04
Attending: FAMILY MEDICINE
Payer: COMMERCIAL

## 2024-04-04 VITALS
DIASTOLIC BLOOD PRESSURE: 83 MMHG | HEART RATE: 96 BPM | BODY MASS INDEX: 22.5 KG/M2 | HEIGHT: 63 IN | WEIGHT: 127 LBS | SYSTOLIC BLOOD PRESSURE: 114 MMHG

## 2024-04-04 DIAGNOSIS — F90.0 ATTENTION DEFICIT HYPERACTIVITY DISORDER (ADHD), PREDOMINANTLY INATTENTIVE TYPE: ICD-10-CM

## 2024-04-04 DIAGNOSIS — Z51.81 MEDICATION MONITORING ENCOUNTER: ICD-10-CM

## 2024-04-04 DIAGNOSIS — F90.9 ATTENTION DEFICIT HYPERACTIVITY DISORDER (ADHD), UNSPECIFIED ADHD TYPE: ICD-10-CM

## 2024-04-04 DIAGNOSIS — Z00.00 ROUTINE HEALTH MAINTENANCE: Primary | ICD-10-CM

## 2024-04-04 LAB
ALBUMIN SERPL-MCNC: 4.8 G/DL (ref 3.2–4.8)
ALBUMIN/GLOB SERPL: 1.8 {RATIO} (ref 1–2)
ALP LIVER SERPL-CCNC: 80 U/L
ALT SERPL-CCNC: 26 U/L
ANION GAP SERPL CALC-SCNC: 7 MMOL/L (ref 0–18)
AST SERPL-CCNC: 19 U/L (ref ?–34)
BASOPHILS # BLD AUTO: 0.06 X10(3) UL (ref 0–0.2)
BASOPHILS NFR BLD AUTO: 0.8 %
BILIRUB SERPL-MCNC: 0.5 MG/DL (ref 0.3–1.2)
BUN BLD-MCNC: 15 MG/DL (ref 9–23)
BUN/CREAT SERPL: 18.3 (ref 10–20)
CALCIUM BLD-MCNC: 9.6 MG/DL (ref 8.7–10.4)
CHLORIDE SERPL-SCNC: 105 MMOL/L (ref 98–112)
CHOLEST SERPL-MCNC: 207 MG/DL (ref ?–200)
CO2 SERPL-SCNC: 26 MMOL/L (ref 21–32)
CONTROL LINE PRESENT WITH A CLEAR BACKGROUND (YES/NO): YES YES/NO
CREAT BLD-MCNC: 0.82 MG/DL
DEPRECATED RDW RBC AUTO: 39.9 FL (ref 35.1–46.3)
EGFRCR SERPLBLD CKD-EPI 2021: 97 ML/MIN/1.73M2 (ref 60–?)
EOSINOPHIL # BLD AUTO: 0.17 X10(3) UL (ref 0–0.7)
EOSINOPHIL NFR BLD AUTO: 2.3 %
ERYTHROCYTE [DISTWIDTH] IN BLOOD BY AUTOMATED COUNT: 11.8 % (ref 11–15)
FASTING PATIENT LIPID ANSWER: NO
FASTING STATUS PATIENT QL REPORTED: NO
GLOBULIN PLAS-MCNC: 2.6 G/DL (ref 2.8–4.4)
GLUCOSE BLD-MCNC: 94 MG/DL (ref 70–99)
HCT VFR BLD AUTO: 37.7 %
HDLC SERPL-MCNC: 58 MG/DL (ref 40–59)
HGB BLD-MCNC: 13.2 G/DL
IMM GRANULOCYTES # BLD AUTO: 0.01 X10(3) UL (ref 0–1)
IMM GRANULOCYTES NFR BLD: 0.1 %
KIT LOT #: NORMAL NUMERIC
LDLC SERPL CALC-MCNC: 136 MG/DL (ref ?–100)
LYMPHOCYTES # BLD AUTO: 2.47 X10(3) UL (ref 1–4)
LYMPHOCYTES NFR BLD AUTO: 33.2 %
MCH RBC QN AUTO: 32.3 PG (ref 26–34)
MCHC RBC AUTO-ENTMCNC: 35 G/DL (ref 31–37)
MCV RBC AUTO: 92.2 FL
MONOCYTES # BLD AUTO: 0.81 X10(3) UL (ref 0.1–1)
MONOCYTES NFR BLD AUTO: 10.9 %
NEUTROPHILS # BLD AUTO: 3.91 X10 (3) UL (ref 1.5–7.7)
NEUTROPHILS # BLD AUTO: 3.91 X10(3) UL (ref 1.5–7.7)
NEUTROPHILS NFR BLD AUTO: 52.7 %
NONHDLC SERPL-MCNC: 149 MG/DL (ref ?–130)
OSMOLALITY SERPL CALC.SUM OF ELEC: 287 MOSM/KG (ref 275–295)
PLATELET # BLD AUTO: 366 10(3)UL (ref 150–450)
POTASSIUM SERPL-SCNC: 3.5 MMOL/L (ref 3.5–5.1)
PREGNANCY TEST, URINE: NEGATIVE
PROT SERPL-MCNC: 7.4 G/DL (ref 5.7–8.2)
RBC # BLD AUTO: 4.09 X10(6)UL
SODIUM SERPL-SCNC: 138 MMOL/L (ref 136–145)
TRIGL SERPL-MCNC: 71 MG/DL (ref 30–149)
VLDLC SERPL CALC-MCNC: 13 MG/DL (ref 0–30)
WBC # BLD AUTO: 7.4 X10(3) UL (ref 4–11)

## 2024-04-04 PROCEDURE — 85025 COMPLETE CBC W/AUTO DIFF WBC: CPT | Performed by: FAMILY MEDICINE

## 2024-04-04 PROCEDURE — 80053 COMPREHEN METABOLIC PANEL: CPT

## 2024-04-04 PROCEDURE — 80061 LIPID PANEL: CPT | Performed by: FAMILY MEDICINE

## 2024-04-04 PROCEDURE — 36415 COLL VENOUS BLD VENIPUNCTURE: CPT | Performed by: FAMILY MEDICINE

## 2024-04-04 RX ORDER — DEXTROAMPHETAMINE SACCHARATE, AMPHETAMINE ASPARTATE, DEXTROAMPHETAMINE SULFATE AND AMPHETAMINE SULFATE 2.5; 2.5; 2.5; 2.5 MG/1; MG/1; MG/1; MG/1
10 TABLET ORAL DAILY
Qty: 30 TABLET | Refills: 0 | Status: SHIPPED | OUTPATIENT
Start: 2024-05-05 | End: 2024-06-04

## 2024-04-04 RX ORDER — DEXTROAMPHETAMINE SACCHARATE, AMPHETAMINE ASPARTATE, DEXTROAMPHETAMINE SULFATE AND AMPHETAMINE SULFATE 2.5; 2.5; 2.5; 2.5 MG/1; MG/1; MG/1; MG/1
10 TABLET ORAL DAILY
Qty: 30 TABLET | Refills: 0 | Status: SHIPPED | OUTPATIENT
Start: 2024-04-04 | End: 2024-05-04

## 2024-04-04 RX ORDER — DEXTROAMPHETAMINE SACCHARATE, AMPHETAMINE ASPARTATE, DEXTROAMPHETAMINE SULFATE AND AMPHETAMINE SULFATE 2.5; 2.5; 2.5; 2.5 MG/1; MG/1; MG/1; MG/1
10 TABLET ORAL DAILY
Qty: 30 TABLET | Refills: 0 | Status: SHIPPED | OUTPATIENT
Start: 2024-06-05 | End: 2024-07-05

## 2024-04-04 NOTE — PROGRESS NOTES
Desirae Akers is a 33 year old female.  Chief Complaint   Patient presents with    Routine Physical       HPI:   Patient presents as a 33-year-old female for routine physical exam.  Patient has ADHD takes Adderall at this time.  Patient has very irregular menses and is actively trying to get pregnant.    Current Outpatient Medications   Medication Sig Dispense Refill    amphetamine-dextroamphetamine (ADDERALL) 10 MG Oral Tab Take 1 tablet (10 mg total) by mouth daily. 30 tablet 0    [START ON 2024] amphetamine-dextroamphetamine (ADDERALL) 10 MG Oral Tab Take 1 tablet (10 mg total) by mouth daily. 30 tablet 0    [START ON 2024] amphetamine-dextroamphetamine (ADDERALL) 10 MG Oral Tab Take 1 tablet (10 mg total) by mouth daily. 30 tablet 0    ALPRAZolam 0.5 MG Oral Tab Take 1 tablet (0.5 mg total) by mouth daily as needed (anxiety before flight). (Patient not taking: Reported on 2024) 5 tablet 0    Prenatal Vit-Fe Fumarate-FA (PRENATAL VITAMIN) 27-0.8 MG Oral Tab Take by mouth. (Patient not taking: Reported on 2024)      Scopolamine 1.5mg TD patch 1mg/3days Place 1 patch onto the skin every third day. (Patient not taking: Reported on 2024) 10 patch 0      Past Medical History:   Diagnosis Date    Chicken pox     Dysplasia of cervix     Dr. polanco colposcopy with biopsy    Esophageal reflux     Extrinsic asthma, unspecified     exercise asthma    Recurrent URI (upper respiratory infection)     Ureteral reflux     VUR (vesicoureteric reflux)       Past Surgical History:   Procedure Laterality Date    Ureteroplasty      s/p ureteroplasty      Social History:  Social History     Socioeconomic History    Marital status:    Tobacco Use    Smoking status: Former     Packs/day: 0.50     Years: 7.00     Additional pack years: 0.00     Total pack years: 3.50     Types: Cigarettes     Quit date: 2023     Years since quittin.5    Smokeless tobacco: Never   Vaping Use    Vaping Use: Some  days   Substance and Sexual Activity    Alcohol use: Yes     Alcohol/week: 4.2 standard drinks of alcohol     Types: 5 Shots of liquor per week    Drug use: Yes     Types: Cannabis        REVIEW OF SYSTEMS:   GENERAL HEALTH: No fevers, chills, sweats, fatigue  VISION: No recent vision problems, blurry vision or double vision  HEENT: No decreased hearing ear pain nasal congestion or sore throat  SKIN: denies any unusual skin lesions or rashes  RESPIRATORY: denies shortness of breath, cough, wheezing  CARDIOVASCULAR: denies chest pain on exertion, palpitations, swelling in feet  GI: denies abdominal pain and denies heartburn, nausea or vomiting  : No Pain on urination, change in the color of urine, discharge, urinating frequently  MUS: No back pain, joint pain, muscle pain  NEURO: denies headaches , anxiety, depression    EXAM:   /83 (BP Location: Left arm, Patient Position: Sitting, Cuff Size: adult)   Pulse 96   Ht 5' 3\" (1.6 m)   Wt 127 lb (57.6 kg)   LMP 02/28/2024   BMI 22.50 kg/m²   GENERAL: well developed, well nourished,in no apparent distress  SKIN: no rashes,no suspicious lesions  HEENT: atraumatic, normocephalic,ears and throat are clear,   NECK: supple,no adenopathy,  LUNGS: clear to auscultation, no wheeze  CARDIO: RRR without murmur  GI: good BS's,no masses or tenderness  EXTREMITIES: no cyanosis, or edema    ASSESSMENT AND PLAN:   1. Routine health maintenance  We will draw labs today.  Discussed diet and exercise.  Discussed ovulation kits to try to determine when she is ovulating.  Pregnancy test today is negative.  - CBC With Differential With Platelet  - Lipid Panel  - Urine Pregnancy Test    2. Attention deficit hyperactivity disorder (ADHD), unspecified ADHD type  Refilled Adderall at this time.  Patient has discussed with her OB/GYN and she can take a low-dose of Adderall if and when she becomes pregnant.  - amphetamine-dextroamphetamine (ADDERALL) 10 MG Oral Tab; Take 1 tablet (10  mg total) by mouth daily.  Dispense: 30 tablet; Refill: 0  - amphetamine-dextroamphetamine (ADDERALL) 10 MG Oral Tab; Take 1 tablet (10 mg total) by mouth daily.  Dispense: 30 tablet; Refill: 0  - amphetamine-dextroamphetamine (ADDERALL) 10 MG Oral Tab; Take 1 tablet (10 mg total) by mouth daily.  Dispense: 30 tablet; Refill: 0       The patient indicates understanding of these issues and agrees to the plan.  No follow-ups on file.

## 2024-04-11 DIAGNOSIS — F90.9 ATTENTION DEFICIT HYPERACTIVITY DISORDER (ADHD), UNSPECIFIED ADHD TYPE: ICD-10-CM

## 2024-04-11 NOTE — TELEPHONE ENCOUNTER
Sig:   Take 1 tablet (10 mg total) by mouth 3 (three) times daily.     Route:   Oral     Earliest Fill Date:   1/12/2024         Patient called, states the [facility] adderall was sent to does not have stock of adderall. She asked for it to be sent to [facility] in [facility], pended/  They have limited supply  Also, it should be 3x daily, not 1x daily please advise

## 2024-04-12 RX ORDER — DEXTROAMPHETAMINE SACCHARATE, AMPHETAMINE ASPARTATE, DEXTROAMPHETAMINE SULFATE AND AMPHETAMINE SULFATE 2.5; 2.5; 2.5; 2.5 MG/1; MG/1; MG/1; MG/1
10 TABLET ORAL 3 TIMES DAILY
Qty: 90 TABLET | Refills: 0 | Status: SHIPPED | OUTPATIENT
Start: 2024-06-12 | End: 2024-07-12

## 2024-04-12 RX ORDER — DEXTROAMPHETAMINE SACCHARATE, AMPHETAMINE ASPARTATE, DEXTROAMPHETAMINE SULFATE AND AMPHETAMINE SULFATE 2.5; 2.5; 2.5; 2.5 MG/1; MG/1; MG/1; MG/1
10 TABLET ORAL 3 TIMES DAILY
Qty: 90 TABLET | Refills: 0 | Status: SHIPPED | OUTPATIENT
Start: 2024-04-12 | End: 2024-05-12

## 2024-04-12 RX ORDER — DEXTROAMPHETAMINE SACCHARATE, AMPHETAMINE ASPARTATE, DEXTROAMPHETAMINE SULFATE AND AMPHETAMINE SULFATE 2.5; 2.5; 2.5; 2.5 MG/1; MG/1; MG/1; MG/1
10 TABLET ORAL 3 TIMES DAILY
Qty: 90 TABLET | Refills: 0 | Status: SHIPPED | OUTPATIENT
Start: 2024-05-12 | End: 2024-06-11

## 2024-06-14 DIAGNOSIS — F40.243 FEAR OF FLYING: ICD-10-CM

## 2024-06-14 NOTE — TELEPHONE ENCOUNTER
Patient states [place] is going out of the country and is anxious to fly and was given alprazolam in the past for this.  Patient states last time she received the prescription she only got 5 tablets and per pt \"I will need a lot more than that cause will be there a month and have several flights\"    Please advise.

## 2024-06-15 RX ORDER — ALPRAZOLAM 0.5 MG/1
0.5 TABLET ORAL
Qty: 5 TABLET | Refills: 0 | Status: SHIPPED | OUTPATIENT
Start: 2024-06-15

## 2024-07-07 DIAGNOSIS — F90.9 ATTENTION DEFICIT HYPERACTIVITY DISORDER (ADHD), UNSPECIFIED ADHD TYPE: ICD-10-CM

## 2024-07-09 NOTE — TELEPHONE ENCOUNTER
Please review; protocol failed/ has no protocol        Mamadou Martinez17 minutes ago (4:11 PM)     RD  Patient calling to check on status of refill requested below.     Patient's preferred pharmacy is [facility] in [facility] in [place] on [address]     Patient will be completely out of medication in the next 2 days, refill was requested on 7/7     Please call patient to let her know when the refill has been sent or if Dr. Larry needs anything from patient/         Recent fills: 06/12/2024,05/14/2024,04/12/2024  Last Rx written: 04/12/2024  Last Office Visit : 04/04/2024    Recent Visits  Date Type Provider Dept   04/04/24 Office Visit Ghada Corey MD [facility]         Requested Prescriptions   Pending Prescriptions Disp Refills    amphetamine-dextroamphetamine (ADDERALL) 10 MG Oral Tab 90 tablet 0     Sig: Take 1 tablet (10 mg total) by mouth 3 (three) times daily.       Controlled Substance Medication Failed - 7/9/2024  4:11 PM        Failed - This medication is a controlled substance - forward to provider to refill           Recent Outpatient Visits              3 months ago Routine health maintenance    [facility] Ghada Corey MD    Office Visit    6 months ago Attention deficit hyperactivity disorder (ADHD), predominantly inattentive type    [facility] Tiesha Méndez APRN    Telemedicine    1 year ago Attention deficit hyperactivity disorder (ADHD), predominantly inattentive type    [facility] Henna Burgos APRN    Telemedicine    1 year ago Positive pregnancy test (HCC)    [facility] Weiler, Colleen M, DO    Telemedicine    1 year ago Routine health maintenance    [facility] Ghada Corey MD    Office Visit

## 2024-07-09 NOTE — TELEPHONE ENCOUNTER
Patient calling to check on status of refill requested below.    Patient's preferred pharmacy is [facility] in [facility] in [place] on [address]    Patient will be completely out of medication in the next 2 days, refill was requested on 7/7    Please call patient to let her know when the refill has been sent or if Dr. Larry needs anything from patient/

## 2024-07-10 RX ORDER — DEXTROAMPHETAMINE SACCHARATE, AMPHETAMINE ASPARTATE, DEXTROAMPHETAMINE SULFATE AND AMPHETAMINE SULFATE 2.5; 2.5; 2.5; 2.5 MG/1; MG/1; MG/1; MG/1
10 TABLET ORAL 3 TIMES DAILY
Qty: 90 TABLET | Refills: 0 | Status: SHIPPED | OUTPATIENT
Start: 2024-07-10 | End: 2024-08-09

## 2024-08-09 DIAGNOSIS — F90.9 ATTENTION DEFICIT HYPERACTIVITY DISORDER (ADHD), UNSPECIFIED ADHD TYPE: ICD-10-CM

## 2024-08-10 NOTE — TELEPHONE ENCOUNTER
Patient called to follow up on below request. Patient is out of medication and states she was advised she would receive a 3 month supply last refill. Please advise.

## 2024-08-11 NOTE — TELEPHONE ENCOUNTER
Please review. Protocol Failed; No Protocol      Recent fills: 5/14/2024, 6/12/2024, 7/10/2024  Last Rx written: 7/10/2024  Last office visit: 4/4/2024      Requested Prescriptions   Pending Prescriptions Disp Refills    amphetamine-dextroamphetamine (ADDERALL) 10 MG Oral Tab 90 tablet 0     Sig: Take 1 tablet (10 mg total) by mouth 3 (three) times daily.       Controlled Substance Medication Failed - 8/10/2024 10:30 AM        Failed - This medication is a controlled substance - forward to provider to refill                 Recent Outpatient Visits              4 months ago Routine health maintenance    [facility] Ghada Corey MD    Office Visit    7 months ago Attention deficit hyperactivity disorder (ADHD), predominantly inattentive type    [facility] Tiesha Médnez APRN    Telemedicine    1 year ago Attention deficit hyperactivity disorder (ADHD), predominantly inattentive type    [facility] Henna Burgos APRN    Telemedicine    1 year ago Positive pregnancy test (HCC)    [facility] Weiler, Colleen M, DO    Telemedicine    1 year ago Routine health maintenance    [facility] Ghada Corey MD    Office Visit

## 2024-08-12 RX ORDER — DEXTROAMPHETAMINE SACCHARATE, AMPHETAMINE ASPARTATE, DEXTROAMPHETAMINE SULFATE AND AMPHETAMINE SULFATE 2.5; 2.5; 2.5; 2.5 MG/1; MG/1; MG/1; MG/1
10 TABLET ORAL 3 TIMES DAILY
Qty: 90 TABLET | Refills: 0 | Status: SHIPPED | OUTPATIENT
Start: 2024-08-12 | End: 2024-09-11

## 2024-08-12 RX ORDER — DEXTROAMPHETAMINE SACCHARATE, AMPHETAMINE ASPARTATE, DEXTROAMPHETAMINE SULFATE AND AMPHETAMINE SULFATE 2.5; 2.5; 2.5; 2.5 MG/1; MG/1; MG/1; MG/1
10 TABLET ORAL 3 TIMES DAILY
Qty: 90 TABLET | Refills: 0 | Status: SHIPPED | OUTPATIENT
Start: 2024-08-12 | End: 2024-08-12

## 2024-08-12 NOTE — TELEPHONE ENCOUNTER
Tiesha BURNS  please advise.  Medication was out of stock at [facility] on [address], patient is requesting you to send to [facility] on [address], in her profile.  Please advise?

## 2024-08-12 NOTE — TELEPHONE ENCOUNTER
Called the pharmacy, spoke with [place]. Rx canceled at the [facility] on [address] per provider message below.

## 2024-08-30 NOTE — TELEPHONE ENCOUNTER
Doc Geofm Prima,  Please see pended rx for 3 month supply per our conversation. Thanks. [FreeTextEntry1] : 77-year-old female reestablishing cardiology care. Previously followed with Dr. Matute.  Wants physician closer to home.  Longstanding murmur.  Reported mild valve disease. Mild bradycardia (asymptomatic, incidental).  Reports unremarkable MCOT.  Feels well.  No exercise.  Active.  Maintains home.  Walks stairs with laundry without exertional symptoms.  No chest pain.  Breathing comfortable.  No palpitations, lightheadedness, syncope.  On Lasix for hypertension and prior mild lower extremity edema.   PMH/PSH: Hypertension Hyperlipidemia Hypothyroid  Bilateral knee replacement Cholecystectomy  SOCIAL: Non-smoker

## 2024-09-09 DIAGNOSIS — F90.9 ATTENTION DEFICIT HYPERACTIVITY DISORDER (ADHD), UNSPECIFIED ADHD TYPE: ICD-10-CM

## 2024-09-10 RX ORDER — DEXTROAMPHETAMINE SACCHARATE, AMPHETAMINE ASPARTATE, DEXTROAMPHETAMINE SULFATE AND AMPHETAMINE SULFATE 2.5; 2.5; 2.5; 2.5 MG/1; MG/1; MG/1; MG/1
10 TABLET ORAL 3 TIMES DAILY
Qty: 90 TABLET | Refills: 0 | Status: SHIPPED | OUTPATIENT
Start: 2024-09-10 | End: 2024-10-09

## 2024-09-10 NOTE — TELEPHONE ENCOUNTER
Routing to pod mate due to High Priority status and Dr. Corey is out of office.    Please review; protocol failed  Medication request is marked high priority: patient states she only has 1 tablet left    No future appointments.  LAST OFFICE VISIT: 4/4/2024    Recent fill dates: 8/12/24, 7/10/24, and 6/12/24     For qty of: #90 each for a 30 day supply  Date of  last written prescription:      Date: 8/12/24       Requested Prescriptions   Pending Prescriptions Disp Refills    amphetamine-dextroamphetamine (ADDERALL) 10 MG Oral Tab 90 tablet 0     Sig: Take 1 tablet (10 mg total) by mouth 3 (three) times daily.       Controlled Substance Medication Failed - 9/10/2024  3:25 PM        Failed - This medication is a controlled substance - forward to provider to refill               Recent Outpatient Visits              5 months ago Routine [facility] maintenance    [facility] [place] Ghada Corey MD    Office Visit    8 months ago Attention deficit hyperactivity disorder (ADHD), predominantly inattentive type    [facility] Tiesha Méndez APRN    Telemedicine    1 year ago Attention deficit hyperactivity disorder (ADHD), predominantly inattentive type    [facility], Henna Sanders APRN    Telemedicine    1 year ago Positive pregnancy test (HCC)    [facility] Weiler, Colleen M, DO    Telemedicine    1 year ago Routine [facility] Ghada Corey MD    Office Visit

## 2024-09-10 NOTE — TELEPHONE ENCOUNTER
Patient calling ( name and date of birth of patient verified ) requesting refill for Adderall    She has one pill left      Medication pended to run thru Protocol

## 2024-10-09 DIAGNOSIS — F90.9 ATTENTION DEFICIT HYPERACTIVITY DISORDER (ADHD), UNSPECIFIED ADHD TYPE: ICD-10-CM

## 2024-10-10 RX ORDER — DEXTROAMPHETAMINE SACCHARATE, AMPHETAMINE ASPARTATE, DEXTROAMPHETAMINE SULFATE AND AMPHETAMINE SULFATE 2.5; 2.5; 2.5; 2.5 MG/1; MG/1; MG/1; MG/1
10 TABLET ORAL 3 TIMES DAILY
Qty: 90 TABLET | Refills: 0 | Status: SHIPPED | OUTPATIENT
Start: 2024-10-10 | End: 2024-11-09

## 2024-10-10 NOTE — TELEPHONE ENCOUNTER
Patient scheduled virtual visit 10/14, please advise on refill request. She is out of medication.

## 2024-10-10 NOTE — TELEPHONE ENCOUNTER
Please review. Protocol Failed; No Protocol      Recent fills: 7/10/2024, 8/12/2024, 9/10/2024  Last Rx written: 9/10/2024  Last office visit: 4/4/2024    Future Appointments  Date Type Provider Dept   10/14/24 Appointment Tiesha Méndez APRN [facility]   Showing future appointments within next 150 days with a meds authorizing provider and meeting all other requirements        Requested Prescriptions   Pending Prescriptions Disp Refills    amphetamine-dextroamphetamine (ADDERALL) 10 MG Oral Tab 90 tablet 0     Sig: Take 1 tablet (10 mg total) by mouth 3 (three) times daily.       Controlled Substance Medication Failed - 10/10/2024 11:11 AM        Failed - This medication is a controlled substance - forward to provider to refill               Future Appointments         Provider Department Appt Notes    In 4 days Tiesha Méndez APRN [facility] [place] [facility] Adderall refill          Recent Outpatient Visits              6 months ago Routine health maintenance    [facility] [place] [facility] Ghada Corey MD    Office Visit    9 months ago Attention deficit hyperactivity disorder (ADHD), predominantly inattentive type    [facility] [place] [facility] Tiesha Méndez APRN    Telemedicine    1 year ago Attention deficit hyperactivity disorder (ADHD), predominantly inattentive type    [facility], Henna Sanders APRN    Telemedicine    1 year ago Positive pregnancy test (HCC)    [facility] [place] [facility] Weiler, Colleen M, DO    Telemedicine    1 year ago Routine health maintenance    [facility] [place] [facility] Ghada Corey MD    Office Visit

## 2024-10-28 ENCOUNTER — PATIENT MESSAGE (OUTPATIENT)
Dept: FAMILY MEDICINE CLINIC | Facility: CLINIC | Age: 34
End: 2024-10-28

## 2025-01-06 DIAGNOSIS — F40.243 FEAR OF FLYING: ICD-10-CM

## 2025-01-06 RX ORDER — ALPRAZOLAM 0.5 MG
0.5 TABLET ORAL
Qty: 5 TABLET | Refills: 0 | Status: SHIPPED | OUTPATIENT
Start: 2025-01-06

## 2025-01-06 NOTE — TELEPHONE ENCOUNTER
Please review. Protocol Failed; No Protocol    Routing to podmates due to High Priority status and Dr. Corey is out of office.         Requesting a refill for alprazolam and adderall at the preferred [facility] location. She will be leaving out of town in a few days. Informed that she still have an adderall refill available dated 1/9/25. This prescription  was sent back in November for a 3-month prescription to cover November, December, and January 2025.        Recent fills: 6/15/2024  Last Rx written: 6/15/2024  Last office visit: 4/4/2024  Tele visit: 10/14/2024            Requested Prescriptions   Pending Prescriptions Disp Refills    ALPRAZolam 0.5 MG Oral Tab 5 tablet 0     Sig: Take 1 tablet (0.5 mg total) by mouth daily as needed (anxiety before flight).       Controlled Substance Medication Failed - 1/6/2025 10:50 AM        Failed - This medication is a controlled substance - forward to provider to refill                 Recent Outpatient Visits              2 months ago Attention deficit hyperactivity disorder (ADHD), predominantly inattentive type    [facility] Tiesha Méndez APRN    Telemedicine    9 months ago Routine health maintenance    [facility] Ghada Corey MD    Office Visit    1 year ago Attention deficit hyperactivity disorder (ADHD), predominantly inattentive type    [facility] Tiesha Méndez APRN    Telemedicine    1 year ago Attention deficit hyperactivity disorder (ADHD), predominantly inattentive type    [facility], Henna Sanders APRN    Telemedicine    2 years ago Positive pregnancy test (HCC)    [facility] Weiler, Colleen M, DO    Telemedicine

## 2025-02-04 DIAGNOSIS — F90.0 ATTENTION DEFICIT HYPERACTIVITY DISORDER (ADHD), PREDOMINANTLY INATTENTIVE TYPE: ICD-10-CM

## 2025-02-05 RX ORDER — DEXTROAMPHETAMINE SACCHARATE, AMPHETAMINE ASPARTATE, DEXTROAMPHETAMINE SULFATE AND AMPHETAMINE SULFATE 2.5; 2.5; 2.5; 2.5 MG/1; MG/1; MG/1; MG/1
10 TABLET ORAL 3 TIMES DAILY
Qty: 90 TABLET | Refills: 0 | Status: SHIPPED | OUTPATIENT
Start: 2025-02-05 | End: 2025-03-07

## 2025-02-05 NOTE — TELEPHONE ENCOUNTER
Please review; protocol failed/No Protocol    Recent Fills: 11/07/2024, 12/08/2024, 01/06/2025    Last Rx Written: 10/14/2024    Last Office Visit: 04/04/2024    Requested Prescriptions   Pending Prescriptions Disp Refills    amphetamine-dextroamphetamine (ADDERALL) 10 MG Oral Tab 90 tablet 0     Sig: Take 1 tablet (10 mg total) by mouth 3 (three) times daily.       Controlled Substance Medication Failed - 2/5/2025  4:07 PM        Failed - This medication is a controlled substance - forward to provider to refill        Passed - Medication is active on med list             Recent Outpatient Visits              3 months ago Attention deficit hyperactivity disorder (ADHD), predominantly inattentive type    [facility] Tiesha Méndez APRN    Telemedicine    10 months ago Routine health maintenance    [facility] Ghada Corey MD    Office Visit    1 year ago Attention deficit hyperactivity disorder (ADHD), predominantly inattentive type    [facility] Tiesha Méndez APRN    Telemedicine    1 year ago Attention deficit hyperactivity disorder (ADHD), predominantly inattentive type    [facility], Henna Sanders APRN    Telemedicine    2 years ago Positive pregnancy test (HCC)    [facility] Weiler, Colleen M, DO    Telemedicine

## 2025-02-05 NOTE — TELEPHONE ENCOUNTER
Patient is calling to advise she does not have any medication remaining    [facility] in [facility] confirmed preferred

## 2025-02-06 NOTE — TELEPHONE ENCOUNTER
[place] message sent to patient to schedule an office visit with PCP.   Please make a phone attempt.

## 2025-03-07 DIAGNOSIS — F90.0 ATTENTION DEFICIT HYPERACTIVITY DISORDER (ADHD), PREDOMINANTLY INATTENTIVE TYPE: ICD-10-CM

## 2025-03-07 DIAGNOSIS — F40.243 FEAR OF FLYING: ICD-10-CM

## 2025-03-07 RX ORDER — ALPRAZOLAM 0.5 MG
0.5 TABLET ORAL
Qty: 5 TABLET | Refills: 0 | Status: SHIPPED | OUTPATIENT
Start: 2025-03-07

## 2025-03-07 RX ORDER — DEXTROAMPHETAMINE SACCHARATE, AMPHETAMINE ASPARTATE, DEXTROAMPHETAMINE SULFATE AND AMPHETAMINE SULFATE 2.5; 2.5; 2.5; 2.5 MG/1; MG/1; MG/1; MG/1
10 TABLET ORAL 3 TIMES DAILY
Qty: 90 TABLET | Refills: 0 | Status: SHIPPED | OUTPATIENT
Start: 2025-03-07 | End: 2025-04-06

## 2025-03-07 NOTE — TELEPHONE ENCOUNTER
Please review. Protocol Failed; No Protocol    Routing to podmates due to Dr. Corey is out of office.      Patient called requesting a refill on the following medication:       amphetamine-dextroamphetamine (ADDERALL) 10 MG Oral Tab      ALPRAZolam 0.5 MG Oral Tab      Per patient [place] and needs it refilled before then. Per patient send refill to the following pharmacy:       Alprazolam 05 mg:    Recent fills: 1/6/2025  Last Rx written: 1/6/2025  Last office visit: 4/4/2024    amphetamine-dextroamphetamine (ADDERALL) 10 MG Oral Tab:    Recent fills: 1/6/2025, 2/6/2025  Last Rx written: 2/5/2025  Last office visit: 4/4/2024      Future Appointments  Date Type Provider Dept   04/24/25 Appointment Ghada Corey MD [facility]   Showing future appointments within next 150 days with a meds authorizing provider and meeting all other requirements

## 2025-03-07 NOTE — TELEPHONE ENCOUNTER
Patient called requesting a refill on the following medication:      amphetamine-dextroamphetamine (ADDERALL) 10 MG Oral Tab     ALPRAZolam 0.5 MG Oral Tab     Per patient she is leaving out of the country next week and needs it refilled before then. Per patient send refill to the following pharmacy:    [facility]     [address] [place] [id number]    Patient is scheduled for her annual physical on 04/24/2025.

## 2025-04-04 DIAGNOSIS — F90.0 ATTENTION DEFICIT HYPERACTIVITY DISORDER (ADHD), PREDOMINANTLY INATTENTIVE TYPE: ICD-10-CM

## 2025-04-04 NOTE — TELEPHONE ENCOUNTER
Patient is requesting refill for amphetamine-dextroamphetamine (ADDERALL) 10 MG Oral Tab       [id number] - [place], [place] - [address] AT [facility], [phone], [phone] 31

## 2025-04-07 RX ORDER — DEXTROAMPHETAMINE SACCHARATE, AMPHETAMINE ASPARTATE, DEXTROAMPHETAMINE SULFATE AND AMPHETAMINE SULFATE 2.5; 2.5; 2.5; 2.5 MG/1; MG/1; MG/1; MG/1
10 TABLET ORAL 3 TIMES DAILY
Qty: 90 TABLET | Refills: 0 | Status: SHIPPED | OUTPATIENT
Start: 2025-04-07 | End: 2025-05-07

## 2025-04-07 NOTE — TELEPHONE ENCOUNTER
Routing to pod mate/alternate provider due to High Priority status and Dr. Corey is out of office.    **Dr. Corey returns to office on Wednesday 4/9/25    Please review: medication fails/has no protocol attached.  Medication request is marked high priority: patient states she is completely out of medication    Future Appointments   Date Time Provider Department Center   4/24/2025  3:00 PM Ghada Corey MD [facility]     Recent fill dates: 3/7/25, 2/6/25, and 1/6/25  Date of  last written prescription: 3/7/25   Last written quantity: #90 each and processed as a 30 day supply  Refill date: 04/09/2025  Last office visit: 4/4/2024  [] Takes as needed  [x] Takes scheduled daily

## 2025-04-08 RX ORDER — DEXTROAMPHETAMINE SACCHARATE, AMPHETAMINE ASPARTATE, DEXTROAMPHETAMINE SULFATE AND AMPHETAMINE SULFATE 2.5; 2.5; 2.5; 2.5 MG/1; MG/1; MG/1; MG/1
10 TABLET ORAL 3 TIMES DAILY
Qty: 90 TABLET | Refills: 0 | OUTPATIENT
Start: 2025-04-08 | End: 2025-05-08

## 2025-04-24 ENCOUNTER — OFFICE VISIT (OUTPATIENT)
Dept: FAMILY MEDICINE CLINIC | Facility: CLINIC | Age: 35
End: 2025-04-24

## 2025-04-24 VITALS
DIASTOLIC BLOOD PRESSURE: 82 MMHG | TEMPERATURE: 98 F | RESPIRATION RATE: 18 BRPM | HEART RATE: 114 BPM | BODY MASS INDEX: 23.04 KG/M2 | HEIGHT: 63 IN | WEIGHT: 130 LBS | SYSTOLIC BLOOD PRESSURE: 124 MMHG | OXYGEN SATURATION: 98 %

## 2025-04-24 DIAGNOSIS — Z00.00 ROUTINE PHYSICAL EXAMINATION: Primary | ICD-10-CM

## 2025-04-24 DIAGNOSIS — F90.0 ATTENTION DEFICIT HYPERACTIVITY DISORDER (ADHD), PREDOMINANTLY INATTENTIVE TYPE: ICD-10-CM

## 2025-04-24 PROCEDURE — 3074F SYST BP LT 130 MM HG: CPT | Performed by: FAMILY MEDICINE

## 2025-04-24 PROCEDURE — 3008F BODY MASS INDEX DOCD: CPT | Performed by: FAMILY MEDICINE

## 2025-04-24 PROCEDURE — 3079F DIAST BP 80-89 MM HG: CPT | Performed by: FAMILY MEDICINE

## 2025-04-24 PROCEDURE — 99395 PREV VISIT EST AGE 18-39: CPT | Performed by: FAMILY MEDICINE

## 2025-04-24 RX ORDER — DEXTROAMPHETAMINE SACCHARATE, AMPHETAMINE ASPARTATE, DEXTROAMPHETAMINE SULFATE AND AMPHETAMINE SULFATE 2.5; 2.5; 2.5; 2.5 MG/1; MG/1; MG/1; MG/1
10 TABLET ORAL 3 TIMES DAILY
Qty: 90 TABLET | Refills: 0 | Status: SHIPPED | OUTPATIENT
Start: 2025-05-25 | End: 2025-06-24

## 2025-04-24 RX ORDER — DEXTROAMPHETAMINE SACCHARATE, AMPHETAMINE ASPARTATE, DEXTROAMPHETAMINE SULFATE AND AMPHETAMINE SULFATE 2.5; 2.5; 2.5; 2.5 MG/1; MG/1; MG/1; MG/1
10 TABLET ORAL 3 TIMES DAILY
Qty: 90 TABLET | Refills: 0 | Status: SHIPPED | OUTPATIENT
Start: 2025-06-25 | End: 2025-07-25

## 2025-04-24 RX ORDER — DEXTROAMPHETAMINE SACCHARATE, AMPHETAMINE ASPARTATE, DEXTROAMPHETAMINE SULFATE AND AMPHETAMINE SULFATE 2.5; 2.5; 2.5; 2.5 MG/1; MG/1; MG/1; MG/1
10 TABLET ORAL 3 TIMES DAILY
Qty: 90 TABLET | Refills: 0 | Status: SHIPPED | OUTPATIENT
Start: 2025-04-24 | End: 2025-05-24

## 2025-04-24 NOTE — PROGRESS NOTES
Desirae Akers is a 34 year old female.  Chief Complaint   Patient presents with    Routine Physical     Pt is here for routine physical - recently found out she was pregnant, full work up done at [facility], pt reports everything looks normal       HPI:   Patient is a 34-year-old female who presents today for routine physical exam.  Patient is currently 8 to 9 weeks pregnant and is seeing an obstetrician at [facility].  States all testing has been normal at this time.  Has a scan next week.    Current Medications[1]   Past Medical History[2]   Past Surgical History[3]   Social History:  Short Social Hx on File[4]     REVIEW OF SYSTEMS:   GENERAL HEALTH: No fevers, chills, sweats, fatigue  VISION: No recent vision problems, blurry vision or double vision  HEENT: No decreased hearing ear pain nasal congestion or sore throat  SKIN: denies any unusual skin lesions or rashes  RESPIRATORY: denies shortness of breath, cough, wheezing  CARDIOVASCULAR: denies chest pain on exertion, palpitations, swelling in feet  GI: denies abdominal pain and denies heartburn, nausea or vomiting  : No Pain on urination, change in the color of urine, discharge, urinating frequently  MUS: No back pain, joint pain, muscle pain  NEURO: denies headaches , anxiety, depression    EXAM:   /82 (BP Location: Left arm, Patient Position: Sitting, Cuff Size: adult)   Pulse 114   Temp 98 °F (36.7 °C) (Temporal)   Resp 18   Ht 5' 3\" (1.6 m)   Wt 130 lb (59 kg)   SpO2 98%   Breastfeeding No   BMI 23.03 kg/m²   GENERAL: well developed, well nourished,in no apparent distress  SKIN: no rashes,no suspicious lesions  HEENT: atraumatic, normocephalic,ears and throat are clear,   NECK: supple,no adenopathy,  LUNGS: clear to auscultation, no wheeze  CARDIO: RRR without murmur  GI: good BS's,no masses or tenderness  EXTREMITIES: no cyanosis, or edema    ASSESSMENT AND PLAN:   1. Routine physical examination  Patient is no longer taking  her anxiety medication due to pregnancy.  Patient has cut back on her Adderall this was okayed by her obstetrician.  We do not need to do further blood work today.  Discussed diet and exercise with patient.       The patient indicates understanding of these issues and agrees to the plan.  No follow-ups on file.       [1]   Current Outpatient Medications   Medication Sig Dispense Refill    amphetamine-dextroamphetamine (ADDERALL) 10 MG Oral Tab Take 1 tablet (10 mg total) by mouth 3 (three) times daily. **No further refills - please address all refills at your upcoming appointment** 90 tablet 0    ALPRAZolam 0.5 MG Oral Tab Take 1 tablet (0.5 mg total) by mouth daily as needed (anxiety before flight). 5 tablet 0   [2]   Past Medical History:   Chicken pox    Dysplasia of cervix    Dr. polanco colposcopy with biopsy    Esophageal reflux    Extrinsic asthma, unspecified    exercise asthma    Recurrent URI (upper respiratory infection)    Ureteral reflux    VUR (vesicoureteric reflux)   [3]   Past Surgical History:  Procedure Laterality Date    Ureteroplasty      s/p ureteroplasty   [4]   Social History  Socioeconomic History    Marital status:    Tobacco Use    Smoking status: Former     Current packs/day: 0.00     Average packs/day: 0.5 packs/day for 7.0 years (3.5 ttl pk-yrs)     Types: Cigarettes     Start date: 2016     Quit date: 2023     Years since quittin.5     Passive exposure: Past    Smokeless tobacco: Never   Vaping Use    Vaping status: Former   Substance and Sexual Activity    Alcohol use: Yes     Alcohol/week: 4.2 standard drinks of alcohol     Types: 5 Shots of liquor per week    Drug use: Yes     Types: Cannabis

## 2025-08-01 ENCOUNTER — PATIENT MESSAGE (OUTPATIENT)
Dept: FAMILY MEDICINE CLINIC | Facility: CLINIC | Age: 35
End: 2025-08-01

## 2025-08-01 DIAGNOSIS — F90.0 ATTENTION DEFICIT HYPERACTIVITY DISORDER (ADHD), PREDOMINANTLY INATTENTIVE TYPE: ICD-10-CM

## 2025-08-05 RX ORDER — DEXTROAMPHETAMINE SACCHARATE, AMPHETAMINE ASPARTATE, DEXTROAMPHETAMINE SULFATE AND AMPHETAMINE SULFATE 2.5; 2.5; 2.5; 2.5 MG/1; MG/1; MG/1; MG/1
10 TABLET ORAL 3 TIMES DAILY
Qty: 90 TABLET | Refills: 0 | Status: SHIPPED | OUTPATIENT
Start: 2025-08-05 | End: 2025-09-04

## (undated) NOTE — LETTER
Jo Mckay, [address]  [address], [place]       03/16/20        Patient: Clare Alonzo   YOB: 1990   Date of Visit: 3/16/2020       Dear  Dr. Preet Cho MD,      Thank you for referring Clare Alonzo to my practice.   Pl

## (undated) NOTE — MR AVS SNAPSHOT
[facility]  [address], [address] [place] [address]  [phone]               Thank you for choosing us for your health care visit with Dickson Hollis.  Jonathan Taylor MD.  We are glad to serve you and happy to provide you with this summary Lenin    It is the patient's responsibility to check with and follow their insurance company's guidelines for prior authorization for this test.  You may be held responsible for payment in full if proper authorization is not acqui as the nicotine patch, gum, and lozenges, are available without a prescription. It is best to use these under a doctor’s care, though. The skin patch provides a steady supply of nicotine.  Nicotine gum and lozenges give temporary bursts of low levels of jennifer Allergies as of Jun 13, 2017     Kiwi Extract Rash                Today's Vital Signs     BP Pulse Temp Height Weight BMI    110/77 mmHg 86 97.7 °F (36.5 °C) (Oral) 5' 2.72\" (1.593 m) 137 lb (62.143 kg) 24.49 kg/m2         Current Medications

## (undated) NOTE — LETTER
Dear Employer,  At [facility], we are taking special precautions and doing everything we can to prevent the spread of COVID-19 (coronavirus disease 2019).  During this time, we ask for your assistance regarding physician documentation for empl

## (undated) NOTE — MR AVS SNAPSHOT
After Visit Summary   12/3/2020    Chano Oates    MRN: [id number]           Visit Information     Date & Time  12/3/2020  1:45 PM Provider  Sarah Beth Flower MD [address] [place] [address] , [facility] Dept.  Phone  [phone]      Your V THINPREP PAP WITH HPV REFLEX REQUEST B [[id number] CUSTOM]  12/3/2020 12/3/2021                [facility] now offers Video Visits through [address] for adult and pediatric patients.   Video Visits are available Monday - Friday for many common conditions such as allerg Monday – Friday  10:00 am – 10:00 pm   Saturday – Sunday  10:00 am – 4:00 pm     [address]   Monday – Friday  4:00 pm – 10:00 pm   Saturday – Sunday  10:00 am – 4:00 pm  WALK-IN CARE  Emergency Medicine Providers  Conditions needing urgent attention, but